# Patient Record
Sex: MALE | Race: WHITE | NOT HISPANIC OR LATINO | Employment: OTHER | URBAN - METROPOLITAN AREA
[De-identification: names, ages, dates, MRNs, and addresses within clinical notes are randomized per-mention and may not be internally consistent; named-entity substitution may affect disease eponyms.]

---

## 2017-03-16 ENCOUNTER — TELEPHONE (OUTPATIENT)
Dept: UROLOGY | Facility: CLINIC | Age: 67
End: 2017-03-16

## 2017-03-16 ENCOUNTER — OFFICE VISIT (OUTPATIENT)
Dept: UROLOGY | Facility: CLINIC | Age: 67
End: 2017-03-16
Payer: MEDICARE

## 2017-03-16 VITALS
WEIGHT: 262 LBS | HEIGHT: 72 IN | RESPIRATION RATE: 16 BRPM | DIASTOLIC BLOOD PRESSURE: 72 MMHG | SYSTOLIC BLOOD PRESSURE: 148 MMHG | BODY MASS INDEX: 35.49 KG/M2 | HEART RATE: 60 BPM

## 2017-03-16 DIAGNOSIS — R33.9 URINARY RETENTION: Primary | ICD-10-CM

## 2017-03-16 PROCEDURE — 99999 PR PBB SHADOW E&M-EST. PATIENT-LVL III: CPT | Mod: PBBFAC,,, | Performed by: UROLOGY

## 2017-03-16 PROCEDURE — 1159F MED LIST DOCD IN RCRD: CPT | Mod: S$GLB,,, | Performed by: UROLOGY

## 2017-03-16 PROCEDURE — 1125F AMNT PAIN NOTED PAIN PRSNT: CPT | Mod: S$GLB,,, | Performed by: UROLOGY

## 2017-03-16 PROCEDURE — 1157F ADVNC CARE PLAN IN RCRD: CPT | Mod: S$GLB,,, | Performed by: UROLOGY

## 2017-03-16 PROCEDURE — 51702 INSERT TEMP BLADDER CATH: CPT | Mod: S$GLB,,, | Performed by: UROLOGY

## 2017-03-16 PROCEDURE — 99203 OFFICE O/P NEW LOW 30 MIN: CPT | Mod: 25,S$GLB,, | Performed by: UROLOGY

## 2017-03-16 PROCEDURE — 1160F RVW MEDS BY RX/DR IN RCRD: CPT | Mod: S$GLB,,, | Performed by: UROLOGY

## 2017-03-16 RX ORDER — SIMETHICONE 80 MG
80 TABLET,CHEWABLE ORAL EVERY 6 HOURS PRN
COMMUNITY
End: 2017-05-30

## 2017-03-16 RX ORDER — DOXAZOSIN 1 MG/1
TABLET ORAL
Refills: 0 | COMMUNITY
Start: 2017-03-13 | End: 2017-03-16

## 2017-03-16 RX ORDER — TAMSULOSIN HYDROCHLORIDE 0.4 MG/1
0.4 CAPSULE ORAL DAILY
Qty: 30 CAPSULE | Refills: 11 | Status: SHIPPED | OUTPATIENT
Start: 2017-03-16 | End: 2017-04-20

## 2017-03-16 RX ORDER — SULFAMETHOXAZOLE AND TRIMETHOPRIM 800; 160 MG/1; MG/1
TABLET ORAL
Refills: 0 | COMMUNITY
Start: 2017-03-13 | End: 2017-03-31

## 2017-03-16 RX ORDER — TRAMADOL HYDROCHLORIDE AND ACETAMINOPHEN 37.5; 325 MG/1; MG/1
TABLET, FILM COATED ORAL
Refills: 0 | COMMUNITY
Start: 2017-03-13 | End: 2017-04-20 | Stop reason: CLARIF

## 2017-03-16 RX ORDER — DOCUSATE SODIUM 100 MG/1
CAPSULE, LIQUID FILLED ORAL
Refills: 0 | COMMUNITY
Start: 2017-03-13 | End: 2017-05-30

## 2017-03-16 NOTE — TELEPHONE ENCOUNTER
----- Message from Juanis Strickland sent at 3/16/2017  1:27 PM CDT -----  Contact: Wife   Request to speak to the nurse concerning about the pt's medication. Please contact [phone] Morenita . Thanks!

## 2017-03-16 NOTE — MR AVS SNAPSHOT
[facility]  [address]  [address]  [place] [address]  Phone: [phone]                  Camilo Kruger   3/16/2017 10:20 AM   Office Visit    Description:  Male : 1950   Provider:  Lenore Ramirez MD   Department:  [facility]           Reason for Visit     Urinary Frequency     Nocturia           Diagnoses this Visit        Comments    Urinary retention    -  Primary            To Do List           Future Appointments        Provider Department Dept Phone    3/23/2017 2:00 PM SHENG Rae MD [facility] [phone]    3/30/2017 11:00 AM Lenore Ramirez MD [facility] [phone]      Goals (5 Years of Data)     None      Follow-Up and Disposition     Return in about 1 week (around 3/23/2017) for Voiding trial.       These Medications        Disp Refills Start End    tamsulosin (FLOMAX) 0.4 mg Cp24 30 capsule 11 3/16/2017 4/15/2017    Take 1 capsule (0.4 mg total) by mouth once daily. - Oral    Pharmacy: [facility]/pharmacy #[id number] - [place] - [address] [facility] Ph #: [phone]         [facility]     [facility] -  Assistance  Registered nurses in the [facility] provide clinical advisement, health education, appointment booking, and other advisory services.  Call for this free service at [phone].             Medications           Message regarding Medications     Verify the changes and/or additions to your medication regime listed below are the same as discussed with your clinician today.  If any of these changes or additions are incorrect, please notify your healthcare provider.        START taking these NEW medications        Refills    tamsulosin (FLOMAX) 0.4 mg Cp24 11    Sig: Take 1 capsule (0.4 mg total) by mouth once daily.    Class: Normal    Route: Oral      STOP taking these medications     insulin aspart (NOVOLOG) 100 unit/mL injection Inject into the skin 3  (three) times daily before meals.    doxazosin (CARDURA) 1 MG tablet TAKE 1 TABLET (ORAL) 1 TIME PER DAY FOR 28 DAYS           Verify that the below list of medications is an accurate representation of the medications you are currently taking.  If none reported, the list may be blank. If incorrect, please contact your healthcare provider. Carry this list with you in case of emergency.           Current Medications     docusate sodium (COLACE) 100 MG capsule TAKE ONE CAPSULE BY MOUTH TWICE A DAY FOR 10 DAYS FOR CONSTIPATION    insulin detemir (LEVEMIR) 100 unit/mL injection Inject 25 Units/kg into the skin every evening.    ranitidine (ZANTAC) 150 MG capsule Take 150 mg by mouth 2 (two) times daily.    simethicone (MYLICON) 80 MG chewable tablet Take 80 mg by mouth every 6 (six) hours as needed for Flatulence.    sulfamethoxazole-trimethoprim 800-160mg (BACTRIM DS) 800-160 mg Tab TAKE 1 TABLET (ORAL) 2 TIMES PER DAY FOR 10 DAYS    tramadol-acetaminophen 37.5-325 mg (ULTRACET) 37.5-325 mg Tab TAKE 1 TABLET BY MOUTH EVERY 12 HOURS AS NEEDED FOR 5 DAYS    tamsulosin (FLOMAX) 0.4 mg Cp24 Take 1 capsule (0.4 mg total) by mouth once daily.           Clinical Reference Information           Your Vitals Were     BP Pulse Resp Height Weight BMI    148/72 60 16 6' (1.829 m) 118.8 kg (262 lb) 35.53 kg/m2      Blood Pressure          Most Recent Value    BP  (!)  148/72      Allergies as of 3/16/2017     Aspirin    Penicillins      Immunizations Administered on Date of Encounter - 3/16/2017     None      Orders Placed During Today's Visit     Future Labs/Procedures Expected by Expires    Bladder scan  As directed 3/20/2017      [facility] Sign-Up     Activating your [facility] account is as easy as 1-2-3!     1) Visit [email or web address], select Sign Up Now, enter this activation code and your date of birth, then select Next.  [email or web address]  Expires: 4/30/2017 12:07 PM      2) Create a username and password to use when you visit  [facility] in the future and select a security question in case you lose your password and select Next.    3) Enter your e-mail address and click Sign Up!    Additional Information  If you have questions, please e-mail [email or web address] or call [phone] to talk to our [facility] staff. Remember, [facility] is NOT to be used for urgent needs. For medical emergencies, dial 911.         Language Assistance Services     ATTENTION: Language assistance services are available, free of charge. Please call [phone].      ATENCIÓN: Si habla español, tiene a araujo disposición servicios gratuitos de asistencia lingüística. Llame al [phone].     CHÚ Ý: N?u b?n nói Ti?ng Vi?t, có các d?ch v? h? tr? ngôn ng? mi?n phí dành cho b?n. G?i s? [phone].         [facility] complies with applicable Federal civil rights laws and does not discriminate on the basis of race, color, national origin, age, disability, or sex.

## 2017-03-16 NOTE — PROGRESS NOTES
Subjective:       Camilo Kruger is a 66 y.o. male who is a new patient who was self-referred for evaluation of BPH/UR.       He reports dramatic worsening of urinary issues since Saturday. He was seen in Urgent Care and given Bactrim and Cardura 1mg for LUTS. No records available. He reports he is voiding [id number] with very small volume. He is very uncomfortable and distended. He has seen Dr Johnson in the past but was not on BPH medications. He is DM2 and sugars have been high recently since changing to long acting insulin. No A1c on record. Also with constipation, no BM x 4-5 days.    PVR (bladder scan) today - >999cc      The following portions of the patient's history were reviewed and updated as appropriate: allergies, current medications, past family history, past medical history, past social history, past surgical history and problem list.    Review of Systems  Constitutional: no fever or chills  ENT: no nasal congestion or sore throat  Respiratory: no cough or shortness of breath  Cardiovascular: no chest pain or palpitations  Gastrointestinal: no nausea or vomiting, tolerating diet  Genitourinary: as per HPI  Hematologic/Lymphatic: no easy bruising or lymphadenopathy  Musculoskeletal: no arthralgias or myalgias  Skin: no rashes or lesions  Neurological: no seizures or tremors  Behavioral/Psych: no auditory or visual hallucinations       Objective:    Vitals: BP (!) 148/72  Pulse 60  Resp 16  Ht 6' (1.829 m)  Wt 118.8 kg (262 lb)  BMI 35.53 kg/m2    Physical Exam   General: well developed, well nourished in no acute distress  Head: normocephalic, atraumatic  Neck: supple, trachea midline, no obvious enlargement of thyroid  HEENT: EOMI, mucus membranes moist, sclera anicteric, no hearing impairment  Lungs: symmetric expansion, non-labored breathing  Cardiovascular: regular rate and rhythm, normal pulses  Abdomen: soft, non tender, non distended, no palpable masses, no hepatosplenomegaly, no  hernias, bladder nonpalpable. No CVA tenderness.  Musculoskeletal: no peripheral edema, normal ROM in bilateral upper and lower extremities  Lymphatics: no cervical or inguinal lymphadenopathy  Skin: no rashes or lesions  Neuro: alert and oriented x 3, no gross deficits  Psych: normal judgment and insight, normal mood/affect and non-anxious  Genitourinary:   patient declined exam   ANNEMARIE: patient declined exam      Lab Review   Urine analysis today in clinic shows +glucose 1000    No results found for: WBC, HGB, HCT, MCV, PLT  No results found for: CREATININE, BUN  No results found for: PSA  No results found for: PSADIAG    Imaging  NA       Assessment/Plan:      1. Urinary retention    - Very high PVR. Distended/uncomfortable.   - Garcia placed - 1200cc   - Stop Cardura   - Start Flomax now   - Avoid/treat constipation   - Drink plenty of fluids, possible post-obstructive diuresis       Follow up in 1 week for VOT, see me in 2 weeks

## 2017-03-16 NOTE — TELEPHONE ENCOUNTER
Patient wife instructed to continue abt d/t us having no records on an infx- the prescribing provider of the abt indicated an infx and gave abt therapy- no reason why patient can not cont w/abt.

## 2017-03-23 ENCOUNTER — CLINICAL SUPPORT (OUTPATIENT)
Dept: UROLOGY | Facility: CLINIC | Age: 67
End: 2017-03-23
Payer: MEDICARE

## 2017-03-23 VITALS — WEIGHT: 261.94 LBS | BODY MASS INDEX: 35.48 KG/M2 | HEIGHT: 72 IN

## 2017-03-23 DIAGNOSIS — R33.9 URINARY RETENTION: Primary | ICD-10-CM

## 2017-03-23 PROCEDURE — 99999 PR PBB SHADOW E&M-EST. PATIENT-LVL II: CPT | Mod: PBBFAC,,, | Performed by: UROLOGY

## 2017-03-23 NOTE — PROGRESS NOTES
Name and  verified. Patient verbalized understanding of why they are here today. VOT performed.  Patient catheter bag was removed from catheter and (  240      )  ml of NS was administered. ( 10    )  ml of NS was removed from bulb and catheter d/c'd. Patient voided ( 250     ) ml of clear urine with no abnormalities.  Patient instructed to dress and MD notified. Patient will follow up in one week for a visit and a PVR.

## 2017-03-24 ENCOUNTER — TELEPHONE (OUTPATIENT)
Dept: UROLOGY | Facility: CLINIC | Age: 67
End: 2017-03-24

## 2017-03-24 NOTE — TELEPHONE ENCOUNTER
Spoke to pt per  have him come to the office an do a pvr an if in retention place 16fr.marie. See  in a week for vot./pt's wife notified of this will bring him to office/jhoana

## 2017-03-24 NOTE — TELEPHONE ENCOUNTER
Spoke to pt had voiding trial done yesterday. Pt states his stomach is distended an having pain. Pt was advised to go to er. He states they dont do anything there i told him he couldn't wait until this afternoon he really should go to the er for evaluation. Pt states he will go there for evaluation. Amanda

## 2017-03-24 NOTE — TELEPHONE ENCOUNTER
----- Message from Jaymie La sent at 3/24/2017  8:11 AM CDT -----  Contact: self  Pt would like to speak with Dorcas. Please contact him at [phone].    Thanks

## 2017-03-24 NOTE — PROGRESS NOTES
Patient came into the office this am with c/o pain in the pelvic region and abd distention. He states that even though he is able to urinate some, he feels full and unable to go when he has the urge. PVR performed and revealed 786 ml of urine in the bladder.  Patient was prepped in sterile fashion for catheter to be placed. Patient was cleansed with iodine, then a 16 fr catheter was placed. Clear urine was obtained. Balloon was inflated with 10cc of NS. Patient tolerated procedure well.  Patient to follow up within one week for VOT

## 2017-03-30 ENCOUNTER — OFFICE VISIT (OUTPATIENT)
Dept: UROLOGY | Facility: CLINIC | Age: 67
End: 2017-03-30
Payer: MEDICARE

## 2017-03-30 VITALS
HEIGHT: 72 IN | SYSTOLIC BLOOD PRESSURE: 128 MMHG | WEIGHT: 261.94 LBS | RESPIRATION RATE: 16 BRPM | DIASTOLIC BLOOD PRESSURE: 76 MMHG | BODY MASS INDEX: 35.48 KG/M2 | HEART RATE: 68 BPM

## 2017-03-30 DIAGNOSIS — R33.9 URINARY RETENTION: Primary | ICD-10-CM

## 2017-03-30 DIAGNOSIS — N39.46 MIXED INCONTINENCE URGE AND STRESS: ICD-10-CM

## 2017-03-30 PROCEDURE — 99214 OFFICE O/P EST MOD 30 MIN: CPT | Mod: S$GLB,,, | Performed by: UROLOGY

## 2017-03-30 PROCEDURE — 1160F RVW MEDS BY RX/DR IN RCRD: CPT | Mod: S$GLB,,, | Performed by: UROLOGY

## 2017-03-30 PROCEDURE — 1126F AMNT PAIN NOTED NONE PRSNT: CPT | Mod: S$GLB,,, | Performed by: UROLOGY

## 2017-03-30 PROCEDURE — 99999 PR PBB SHADOW E&M-EST. PATIENT-LVL IV: CPT | Mod: PBBFAC,,, | Performed by: UROLOGY

## 2017-03-30 PROCEDURE — 1157F ADVNC CARE PLAN IN RCRD: CPT | Mod: S$GLB,,, | Performed by: UROLOGY

## 2017-03-30 PROCEDURE — 1159F MED LIST DOCD IN RCRD: CPT | Mod: S$GLB,,, | Performed by: UROLOGY

## 2017-03-30 RX ORDER — FINASTERIDE 5 MG/1
5 TABLET, FILM COATED ORAL DAILY
Qty: 30 TABLET | Refills: 11 | Status: SHIPPED | OUTPATIENT
Start: 2017-03-30 | End: 2017-04-29

## 2017-03-30 RX ORDER — POLYETHYLENE GLYCOL 3350 17 G/17G
POWDER, FOR SOLUTION ORAL
Refills: 1 | COMMUNITY
Start: 2017-03-22 | End: 2017-05-30

## 2017-03-30 RX ORDER — INSULIN ASPART 100 [IU]/ML
INJECTION, SUSPENSION SUBCUTANEOUS
Refills: 2 | COMMUNITY
Start: 2017-03-22 | End: 2017-04-27 | Stop reason: CLARIF

## 2017-03-30 RX ORDER — CIPROFLOXACIN 250 MG/1
500 TABLET, FILM COATED ORAL
Status: CANCELLED | OUTPATIENT
Start: 2017-03-30

## 2017-03-30 NOTE — PROGRESS NOTES
Subjective:       Camilo Kruger is a 66 y.o. male who is an established patient who was self-referred for evaluation of BPH/UR.       He reports dramatic worsening of urinary issues since Saturday. He was seen in Urgent Care and given Bactrim and Cardura 1mg for LUTS. No records available. He reports he is voiding [id number] with very small volume. He is very uncomfortable and distended. He has seen Dr Johnson in the past but was not on BPH medications. He is DM2 and sugars have been high recently since changing to long acting insulin. No A1c on record. Also with constipation, no BM x 4-5 days.    PVR (bladder scan) initial visit - >999cc. Marie catheter was placed.    He underwent void trial last week as RN visit that was successful. Unfortunately, he developed recurrent UR later that day and had marie catheter replaced (>700cc). He remains on Flomax.       The following portions of the patient's history were reviewed and updated as appropriate: allergies, current medications, past family history, past medical history, past social history, past surgical history and problem list.    Review of Systems  Constitutional: no fever or chills  ENT: no nasal congestion or sore throat  Respiratory: no cough or shortness of breath  Cardiovascular: no chest pain or palpitations  Gastrointestinal: no nausea or vomiting, tolerating diet  Genitourinary: as per HPI  Hematologic/Lymphatic: no easy bruising or lymphadenopathy  Musculoskeletal: no arthralgias or myalgias  Skin: no rashes or lesions  Neurological: no seizures or tremors  Behavioral/Psych: no auditory or visual hallucinations       Objective:    Vitals: /76  Pulse 68  Resp 16  Ht 6' (1.829 m)  Wt 118.8 kg (261 lb 14.5 oz)  BMI 35.52 kg/m2    Physical Exam   General: well developed, well nourished in no acute distress  Head: normocephalic, atraumatic  Neck: supple, trachea midline, no obvious enlargement of thyroid  HEENT: EOMI, mucus membranes moist,  sclera anicteric, no hearing impairment  Lungs: symmetric expansion, non-labored breathing  Cardiovascular: regular rate and rhythm, normal pulses  Abdomen: soft, non tender, non distended, no palpable masses, no hepatosplenomegaly, no hernias, bladder nonpalpable. No CVA tenderness.  Musculoskeletal: no peripheral edema, normal ROM in bilateral upper and lower extremities  Lymphatics: no cervical or inguinal lymphadenopathy  Skin: no rashes or lesions  Neuro: alert and oriented x 3, no gross deficits  Psych: normal judgment and insight, normal mood/affect and non-anxious  Genitourinary:   Penis -  circumcised penis without plaques, lesions, or scarring.  Urethra - orthotopic location without stenosis.  Scrotum  - no lesions or rashes, no hydrocele or hernia.  Testes - descended bilaterally, symmetric without masses, non tender.  Epididymides - no cysts or lesions, no spermatocele, symmetric MARIE IN PLACE   ANNEMARIE: Symmetric 40g prostate without nodularity or tenderness. Normal landmarks. seminal vesicles non palpable, normal sphincter tone without hemorrhoids or rectal masses. Normal appearance of anus and perineum.      Lab Review   Urine analysis today in clinic shows - marie    No results found for: WBC, HGB, HCT, MCV, PLT  No results found for: CREATININE, BUN  No results found for: PSA  No results found for: PSADIAG    Imaging  NA       Assessment/Plan:      1. Urinary retention    - Very high PVR. Distended/uncomfortable.   - Marie placed - 1200cc initially   - Continue Flomax   - Start Proscar   - Avoid/treat constipation   - Initially successful VOT but failed later that day. Declines VOT today.   - Will proceed with cysto/UDS on 4/4/17       Follow up in 1-2 weeks

## 2017-03-30 NOTE — MR AVS SNAPSHOT
[facility]  [address]  [place] [address]  Phone: [phone]                  Camilo Villegasulisses   3/30/2017 11:00 AM   Office Visit    Description:  Male : 1950   Provider:  Lenore Ramirez MD   Department:  [facility]           Reason for Visit     Follow-up           Diagnoses this Visit        Comments    Urinary retention    -  Primary     Mixed incontinence urge and stress                To Do List           Goals (5 Years of Data)     None      Follow-Up and Disposition     Return in about 2 weeks (around 2017) for Post-op.       These Medications        Disp Refills Start End    finasteride (PROSCAR) 5 mg tablet 30 tablet 11 3/30/2017 2017    Take 1 tablet (5 mg total) by mouth once daily. - Oral    Pharmacy: [facility]/pharmacy #[id number] - [place] - [address] [facility] Ph #: [phone]         [facility]     [facility] On Call Nurse Care Line -  Assistance  Unless otherwise directed by your provider, please contact [facility], our nurse care line that is available for  assistance.     Registered nurses in the [facility] provide: appointment scheduling, clinical advisement, health education, and other advisory services.  Call: [phone] (toll free)               Medications           Message regarding Medications     Verify the changes and/or additions to your medication regime listed below are the same as discussed with your clinician today.  If any of these changes or additions are incorrect, please notify your healthcare provider.        START taking these NEW medications        Refills    finasteride (PROSCAR) 5 mg tablet 11    Sig: Take 1 tablet (5 mg total) by mouth once daily.    Class: Normal    Route: Oral      STOP taking these medications     insulin detemir (LEVEMIR) 100 unit/mL injection Inject 25 Units/kg into the skin every evening.           Verify that the below list of medications is  an accurate representation of the medications you are currently taking.  If none reported, the list may be blank. If incorrect, please contact your healthcare provider. Carry this list with you in case of emergency.           Current Medications     docusate sodium (COLACE) 100 MG capsule TAKE ONE CAPSULE BY MOUTH TWICE A DAY FOR 10 DAYS FOR CONSTIPATION    NOVOLOG MIX 70-30 FLEXPEN 100 unit/mL (70-30) InPn pen INEJCT 33 UNITS TWICE A DAY    polyethylene glycol (GLYCOLAX) 17 gram/dose powder MIX 17 GRAM(S) IN JUICE OR WATER DAILY    ranitidine (ZANTAC) 150 MG capsule Take 150 mg by mouth 2 (two) times daily.    simethicone (MYLICON) 80 MG chewable tablet Take 80 mg by mouth every 6 (six) hours as needed for Flatulence.    sulfamethoxazole-trimethoprim 800-160mg (BACTRIM DS) 800-160 mg Tab TAKE 1 TABLET (ORAL) 2 TIMES PER DAY FOR 10 DAYS    tamsulosin (FLOMAX) 0.4 mg Cp24 Take 1 capsule (0.4 mg total) by mouth once daily.    tramadol-acetaminophen 37.5-325 mg (ULTRACET) 37.5-325 mg Tab TAKE 1 TABLET BY MOUTH EVERY 12 HOURS AS NEEDED FOR 5 DAYS    finasteride (PROSCAR) 5 mg tablet Take 1 tablet (5 mg total) by mouth once daily.           Clinical Reference Information           Your Vitals Were     BP Pulse Resp Height Weight BMI    128/76 68 16 6' (1.829 m) 118.8 kg (261 lb 14.5 oz) 35.52 kg/m2      Blood Pressure          Most Recent Value    BP  128/76      Allergies as of 3/30/2017     Aspirin    Penicillins      Immunizations Administered on Date of Encounter - 3/30/2017     None      Orders Placed During Today's Visit      Normal Orders This Visit    Case Request Operating Room: CYSTOSCOPY - URODYNAMICS FLOW STUDY       [facility] Sign-Up     Activating your [facility] account is as easy as 1-2-3!     1) Visit [email or web address], select Sign Up Now, enter this activation code and your date of birth, then select Next.  [email or web address]  Expires: 4/30/2017 12:07 PM      2) Create a username and password to use when you  visit [facility] in the future and select a security question in case you lose your password and select Next.    3) Enter your e-mail address and click Sign Up!    Additional Information  If you have questions, please e-mail [email or web address] or call [phone] to talk to our [facility] staff. Remember, [facility] is NOT to be used for urgent needs. For medical emergencies, dial 911.         Language Assistance Services     ATTENTION: Language assistance services are available, free of charge. Please call [phone].      ATENCIÓN: Si habla español, tiene a araujo disposición servicios gratuitos de asistencia lingüística. Llame al [phone].     CHÚ Ý: N?u b?n nói Ti?ng Vi?t, có các d?ch v? h? tr? ngôn ng? mi?n phí dành cho b?n. G?i s? [phone].         [facility] complies with applicable Federal civil rights laws and does not discriminate on the basis of race, color, national origin, age, disability, or sex.

## 2017-03-30 NOTE — H&P
Subjective:       Camilo Kruegr is a 66 y.o. male who is an established patient who was self-referred for evaluation of BPH/UR.       He reports dramatic worsening of urinary issues since Saturday. He was seen in Urgent Care and given Bactrim and Cardura 1mg for LUTS. No records available. He reports he is voiding [id number] with very small volume. He is very uncomfortable and distended. He has seen Dr Johnson in the past but was not on BPH medications. He is DM2 and sugars have been high recently since changing to long acting insulin. No A1c on record. Also with constipation, no BM x 4-5 days.    PVR (bladder scan) initial visit - >999cc. Marie catheter was placed.    He underwent void trial last week as RN visit that was successful. Unfortunately, he developed recurrent UR later that day and had marie catheter replaced (>700cc). He remains on Flomax.       The following portions of the patient's history were reviewed and updated as appropriate: allergies, current medications, past family history, past medical history, past social history, past surgical history and problem list.    Review of Systems  Constitutional: no fever or chills  ENT: no nasal congestion or sore throat  Respiratory: no cough or shortness of breath  Cardiovascular: no chest pain or palpitations  Gastrointestinal: no nausea or vomiting, tolerating diet  Genitourinary: as per HPI  Hematologic/Lymphatic: no easy bruising or lymphadenopathy  Musculoskeletal: no arthralgias or myalgias  Skin: no rashes or lesions  Neurological: no seizures or tremors  Behavioral/Psych: no auditory or visual hallucinations       Objective:    Vitals: /76  Pulse 68  Resp 16  Ht 6' (1.829 m)  Wt 118.8 kg (261 lb 14.5 oz)  BMI 35.52 kg/m2    Physical Exam   General: well developed, well nourished in no acute distress  Head: normocephalic, atraumatic  Neck: supple, trachea midline, no obvious enlargement of thyroid  HEENT: EOMI, mucus membranes moist,  sclera anicteric, no hearing impairment  Lungs: symmetric expansion, non-labored breathing  Cardiovascular: regular rate and rhythm, normal pulses  Abdomen: soft, non tender, non distended, no palpable masses, no hepatosplenomegaly, no hernias, bladder nonpalpable. No CVA tenderness.  Musculoskeletal: no peripheral edema, normal ROM in bilateral upper and lower extremities  Lymphatics: no cervical or inguinal lymphadenopathy  Skin: no rashes or lesions  Neuro: alert and oriented x 3, no gross deficits  Psych: normal judgment and insight, normal mood/affect and non-anxious  Genitourinary:   Penis -  circumcised penis without plaques, lesions, or scarring.  Urethra - orthotopic location without stenosis.  Scrotum  - no lesions or rashes, no hydrocele or hernia.  Testes - descended bilaterally, symmetric without masses, non tender.  Epididymides - no cysts or lesions, no spermatocele, symmetric MARIE IN PLACE   ANNEMARIE: Symmetric 40g prostate without nodularity or tenderness. Normal landmarks. seminal vesicles non palpable, normal sphincter tone without hemorrhoids or rectal masses. Normal appearance of anus and perineum.      Lab Review   Urine analysis today in clinic shows - marie    No results found for: WBC, HGB, HCT, MCV, PLT  No results found for: CREATININE, BUN  No results found for: PSA  No results found for: PSADIAG    Imaging  NA       Assessment/Plan:      1. Urinary retention    - Very high PVR. Distended/uncomfortable.   - Marie placed - 1200cc initially   - Continue Flomax   - Start Proscar   - Avoid/treat constipation   - Initially successful VOT but failed later that day. Declines VOT today.   - Will proceed with cysto/UDS on 4/4/17       Follow up in 1-2 weeks

## 2017-03-31 ENCOUNTER — HOSPITAL ENCOUNTER (OUTPATIENT)
Dept: PREADMISSION TESTING | Facility: HOSPITAL | Age: 67
Discharge: HOME | End: 2017-03-31
Attending: UROLOGY
Payer: MEDICARE

## 2017-03-31 VITALS
BODY MASS INDEX: 34.69 KG/M2 | TEMPERATURE: 97 F | HEIGHT: 72 IN | WEIGHT: 256.13 LBS | OXYGEN SATURATION: 95 % | SYSTOLIC BLOOD PRESSURE: 133 MMHG | DIASTOLIC BLOOD PRESSURE: 71 MMHG | RESPIRATION RATE: 20 BRPM

## 2017-03-31 NOTE — DISCHARGE INSTRUCTIONS
Your surgery is scheduled for _TUESDAY 4/4___________________.    Call [phone] between 12 p.m. and 5 p.m. on   _MONDAY 4/3______________ to find out your arrival time for the day of your surgery.      Please report to SAME DAY SURGERY UNIT on the 2nd FLOOR at _______ a.m.  Use front door entrance. The doors open at 0530 am.          INSTRUCTIONS IMPORTANT!!!      ¨ YOU MAY eat or drink after t-including water. OK to brush teeth, no   gum, candy or mints!    ¨ Take only these medicines with a small swallow of water-morning of surgery.--NORMAL MORNING MEDICINE            __X__  Prep instructions:   SHOWER   OTHER  ______________________    _X___  No powder, lotions or creams to your body.  _X___  You may wear only deodorant on the day of surgery.  _X___  Please remove all jewelry, including piercings and leave at home.  _X___  No money or valuables needed. Please leave at home.  You may bring your           cell phone.    _X___  If going home the same day, arrange for a ride home. You will not be able to   drive if Anesthesia was used.    _X___  Wear loose fitting clothing. Allow for dressings, bandages.  __X__  Stop Aspirin, Ibuprofen, Motrin and Aleve at least 3-5 days before                       surgery, unless otherwise instructed by your doctor, or the nurse.              You MAY use Tylenol/acetaminophen until day of surgery.    _X___  Call MD for temperature above 101 degrees.        __X__ Stop taking any Fish Oil supplement or any Vitamins that contain Vitamin                  E at least 5 days prior to surgery.          I have read or had read and explained to me, and understand the above information.    Additional comments or instructions:Please call   [phone] if you have any questions regarding the instructions above.

## 2017-03-31 NOTE — PLAN OF CARE
Pre-operative instructions, medication directives and pain scales reviewed with Mr Kruger. All questions the patient had  were answered. Re-assurance about surgical procedure and day of surgery routine given as needed. Mr Kruger verbalized understanding of the pre-op instructions.

## 2017-03-31 NOTE — IP AVS SNAPSHOT
[place] [address] [place] [address]  Phone: [phone]           Patient Discharge Instructions  Our goal is to set you up for success. This packet includes information on your condition, medications, and your home care. It will help you care for yourself to prevent having to return to the hospital.     Please ask your nurse if you have any questions.      There are many details to remember when preparing for your surgery. Here is what you will need to do, please ask your nurse if there are more specific instructions and if you have any questions:    1. Before procedure Do not smoke or drink alcoholic beverages 24 hours prior to your procedure. Do not eat or drink anything 8 hours before your procedure - this includes gum, mints, and candy.     2. Day of procedure Please remove all jewelry for the procedure. If you wear contact lenses, dentures, hearing aids or glasses, bring a container to put them in during your surgery and give to a family member.  If your doctor has scheduled you for an overnight stay, bring a small overnight bag with any personal items that you need.      3. After procedure  Make arrangements in advance for transportation home by a responsible adult. It is not safe to drive a vehicle during the 24 hours following surgery.     PLEASE NOTE: You may be contacted the day before your surgery to confirm your surgery date and arrival time. The Surgery schedule has many variables which may affect the time of your surgery case. Family members should be available if your surgery time changes.           [facility]  Unless otherwise directed by your provider, please   contact [facility], our nurse care line   that is available for 24/7 assistance.     [phone] (toll-free)     Registered nurses in the [facility]   provide: appointment scheduling, clinical advisement, health education, and other advisory services.                  ** Verify  the list of medication(s) below is accurate and up to date. Carry this with you in case of emergency. If your medications have changed, please notify your healthcare provider.             Medication List      TAKE these medications        Additional Info                      docusate sodium 100 MG capsule   Commonly known as:  COLACE   Refills:  0    Instructions:  TAKE ONE CAPSULE BY MOUTH TWICE A DAY FOR 10 DAYS FOR CONSTIPATION     Begin Date    AM    Noon    PM    Bedtime       finasteride 5 mg tablet   Commonly known as:  PROSCAR   Quantity:  30 tablet   Refills:  11   Dose:  5 mg    Instructions:  Take 1 tablet (5 mg total) by mouth once daily.     Begin Date    AM    Noon    PM    Bedtime       NOVOLOG MIX 70-30 FLEXPEN 100 unit/mL (70-30) Inpn pen   Refills:  2   Generic drug:  insulin aspart protamine-insulin aspart    Instructions:  INEJCT 33 UNITS TWICE A DAY     Begin Date    AM    Noon    PM    Bedtime       polyethylene glycol 17 gram/dose powder   Commonly known as:  GLYCOLAX   Refills:  1    Instructions:  MIX 17 GRAM(S) IN JUICE OR WATER DAILY     Begin Date    AM    Noon    PM    Bedtime       ranitidine 150 MG capsule   Commonly known as:  ZANTAC   Refills:  0   Dose:  150 mg    Instructions:  Take 150 mg by mouth 2 (two) times daily.     Begin Date    AM    Noon    PM    Bedtime       simethicone 80 MG chewable tablet   Commonly known as:  MYLICON   Refills:  0   Dose:  80 mg    Instructions:  Take 80 mg by mouth every 6 (six) hours as needed for Flatulence.     Begin Date    AM    Noon    PM    Bedtime       tamsulosin 0.4 mg Cp24   Commonly known as:  FLOMAX   Quantity:  30 capsule   Refills:  11   Dose:  0.4 mg    Instructions:  Take 1 capsule (0.4 mg total) by mouth once daily.     Begin Date    AM    Noon    PM    Bedtime       tramadol-acetaminophen 37.5-325 mg 37.5-325 mg Tab   Commonly known as:  ULTRACET   Refills:  0    Instructions:  TAKE 1 TABLET BY MOUTH EVERY 12 HOURS AS NEEDED FOR  5 DAYS     Begin Date    AM    Noon    PM    Bedtime                  Please bring to all follow up appointments:    1. A copy of your discharge instructions.  2. All medicines you are currently taking in their original bottles.  3. Identification and insurance card.    Please arrive 15 minutes ahead of scheduled appointment time.    Please call 24 hours in advance if you must reschedule your appointment and/or time.        Your Scheduled Appointments     Apr 12, 2017 10:00 AM CDT   Established Patient Visit with Lenore Ramirez MD   [facility] ([facility])    [address] [place] [address], [facility] [address]  [facility] [address] [phone]              Your Future Surgeries/Procedures     Apr 04, 2017   Surgery with Lenore Ramirez MD   [facility] ([facility])    [facility] [place] [address] [phone]                  Discharge Instructions         Your surgery is scheduled for _TUESDAY 4/4___________________.    Call [phone] between 12 p.m. and 5 p.m. on   _MONDAY 4/3______________ to find out your arrival time for the day of your surgery.      Please report to SAME DAY SURGERY UNIT on the 2nd FLOOR at _______ a.m.  Use front door entrance. The doors open at 0530 am.          INSTRUCTIONS IMPORTANT!!!      ¨ YOU MAY eat or drink after t-including water. OK to brush teeth, no   gum, candy or mints!    ¨ Take only these medicines with a small swallow of water-morning of surgery.--NORMAL MORNING MEDICINE            __X__  Prep instructions:   SHOWER   OTHER  ______________________    _X___  No powder, lotions or creams to your body.  _X___  You may wear only deodorant on the day of surgery.  _X___  Please remove all jewelry, including piercings and leave at home.  _X___  No money or valuables needed. Please leave at home.  You may bring your           cell phone.    _X___  If going home the same day, arrange for a ride home. You will not be able to  "  drive if Anesthesia was used.    _X___  Wear loose fitting clothing. Allow for dressings, bandages.  __X__  Stop Aspirin, Ibuprofen, Motrin and Aleve at least 3-5 days before                       surgery, unless otherwise instructed by your doctor, or the nurse.              You MAY use Tylenol/acetaminophen until day of surgery.    _X___  Call MD for temperature above 101 degrees.        __X__ Stop taking any Fish Oil supplement or any Vitamins that contain Vitamin                  E at least 5 days prior to surgery.          I have read or had read and explained to me, and understand the above information.    Additional comments or instructions:Please call   [phone] if you have any questions regarding the instructions above.                 Admission Information     Date & Time Provider Department CSN    3/31/2017  8:00 AM Lenore Ramirez MD [facility] [id number]      Care Providers     Provider Role Specialty Primary office phone    Lenore Ramirez MD Attending Provider Urology [phone]      Your Vitals Were     BP Temp Resp Height Weight SpO2    133/71 (BP Location: Right arm, Patient Position: Sitting, BP Method: Automatic) 96.9 °F (36.1 °C) (Oral) 20 6' (1.829 m) 116.2 kg (256 lb 2 oz) 95%    BMI                34.74 kg/m2          Recent Lab Values     No lab values to display.      Allergies as of 3/31/2017        Reactions    Aspirin Other (See Comments)    Nose bleeds     Cholesterol Analogues Other (See Comments)    "stabbing pains in muscles"    Codeine Hallucinations    Metformin Other (See Comments)    "stabbing pain in muscles"    Penicillins       Advance Directives     An advance directive is a document which, in the event you are no longer able to make decisions for yourself, tells your healthcare team what kind of treatment you do or do not want to receive, or who you would like to make those decisions for you.  If you do not currently have an advance " directive, [facility] encourages you to create one.  For more information call:  [phone]), [phone] ([phone]),  or log on to [email or web address].        Language Assistance Services     ATTENTION: Language assistance services are available, free of charge. Please call [phone].      ATENCIÓN: Si habla español, tiene a araujo disposición servicios gratuitos de asistencia lingüística. Llame al [phone].     CHÚ Ý: N?u b?n nói Ti?ng Vi?t, có các d?ch v? h? tr? ngôn ng? mi?n phí dành cho b?n. G?i s? [phone].        [facility] Sign-Up     Activating your [facility] account is as easy as 1-2-3!     1) Visit [email or web address], select Sign Up Now, enter this activation code and your date of birth, then select Next.  [email or web address]  Expires: 4/30/2017 12:07 PM      2) Create a username and password to use when you visit [facility] in the future and select a security question in case you lose your password and select Next.    3) Enter your e-mail address and click Sign Up!    Additional Information  If you have questions, please e-mail [email or web address] or call [phone] to talk to our [facility] staff. Remember, [facility] is NOT to be used for urgent needs. For medical emergencies, dial 911.          [facility] complies with applicable Federal civil rights laws and does not discriminate on the basis of race, color, national origin, age, disability, or sex.

## 2017-04-04 ENCOUNTER — TELEPHONE (OUTPATIENT)
Dept: UROLOGY | Facility: CLINIC | Age: 67
End: 2017-04-04

## 2017-04-04 ENCOUNTER — HOSPITAL ENCOUNTER (OUTPATIENT)
Facility: HOSPITAL | Age: 67
Discharge: HOME | End: 2017-04-04
Attending: UROLOGY | Admitting: UROLOGY
Payer: MEDICARE

## 2017-04-04 ENCOUNTER — SURGERY (OUTPATIENT)
Age: 67
End: 2017-04-04

## 2017-04-04 VITALS
OXYGEN SATURATION: 98 % | HEIGHT: 72 IN | SYSTOLIC BLOOD PRESSURE: 133 MMHG | TEMPERATURE: 98 F | RESPIRATION RATE: 18 BRPM | WEIGHT: 256 LBS | HEART RATE: 76 BPM | BODY MASS INDEX: 34.67 KG/M2 | DIASTOLIC BLOOD PRESSURE: 77 MMHG

## 2017-04-04 DIAGNOSIS — R33.9 URINARY RETENTION: Primary | ICD-10-CM

## 2017-04-04 DIAGNOSIS — N39.46 MIXED INCONTINENCE URGE AND STRESS: ICD-10-CM

## 2017-04-04 PROCEDURE — 52000 CYSTOURETHROSCOPY: CPT | Mod: 59,,, | Performed by: UROLOGY

## 2017-04-04 PROCEDURE — 25000003 PHARM REV CODE 250: Performed by: UROLOGY

## 2017-04-04 PROCEDURE — 36000706: Performed by: UROLOGY

## 2017-04-04 PROCEDURE — 71000015 HC POSTOP RECOV 1ST HR: Performed by: UROLOGY

## 2017-04-04 PROCEDURE — 76000 FLUOROSCOPY <1 HR PHYS/QHP: CPT | Mod: 26,59,, | Performed by: UROLOGY

## 2017-04-04 PROCEDURE — 51728 CYSTOMETROGRAM W/VP: CPT | Mod: 26,,, | Performed by: UROLOGY

## 2017-04-04 PROCEDURE — 36000707: Performed by: UROLOGY

## 2017-04-04 PROCEDURE — 25500020 PHARM REV CODE 255: Performed by: UROLOGY

## 2017-04-04 PROCEDURE — 51784 ANAL/URINARY MUSCLE STUDY: CPT | Mod: 26,51,, | Performed by: UROLOGY

## 2017-04-04 PROCEDURE — 51741 ELECTRO-UROFLOWMETRY FIRST: CPT | Mod: 26,51,, | Performed by: UROLOGY

## 2017-04-04 RX ORDER — ACETAMINOPHEN 325 MG/1
650 TABLET ORAL EVERY 4 HOURS PRN
Status: DISCONTINUED | OUTPATIENT
Start: 2017-04-04 | End: 2017-04-04 | Stop reason: HOSPADM

## 2017-04-04 RX ORDER — ONDANSETRON 2 MG/ML
4 INJECTION INTRAMUSCULAR; INTRAVENOUS EVERY 12 HOURS PRN
Status: DISCONTINUED | OUTPATIENT
Start: 2017-04-04 | End: 2017-04-04 | Stop reason: HOSPADM

## 2017-04-04 RX ORDER — LIDOCAINE HYDROCHLORIDE 20 MG/ML
JELLY TOPICAL
Status: DISCONTINUED | OUTPATIENT
Start: 2017-04-04 | End: 2017-04-04 | Stop reason: HOSPADM

## 2017-04-04 RX ORDER — CIPROFLOXACIN 500 MG/1
500 TABLET ORAL
Status: COMPLETED | OUTPATIENT
Start: 2017-04-04 | End: 2017-04-04

## 2017-04-04 RX ADMIN — LIDOCAINE HYDROCHLORIDE 10 ML: 20 JELLY TOPICAL at 01:04

## 2017-04-04 RX ADMIN — IOTHALAMATE MEGLUMINE 250 ML: 172 INJECTION URETERAL at 01:04

## 2017-04-04 RX ADMIN — CIPROFLOXACIN HYDROCHLORIDE 500 MG: 500 TABLET, FILM COATED ORAL at 12:04

## 2017-04-04 NOTE — OP NOTE
[facility]  Surgery Department  Operative Note      Date of Procedure:  04/04/2017 12:26 PM     Procedure:   1. Complex urodynamics with fluoroscopy  2. Cystoscopy    Surgeon(s) and Role:     * Lenore Ramirez MD - Primary    Assisting Surgeon: None    Pre-Operative Diagnosis: Urinary retention    Post-Operative Diagnosis: Post-Op Diagnosis Codes:   Urinary retention    Indication for procedure:  Camilo Kruger is a 66 y.o. male who is an established patient who was self-referred for evaluation of BPH/UR.      He reports dramatic worsening of urinary issues since Saturday. He was seen in Urgent Care and given Bactrim and Cardura 1mg for LUTS. No records available. He reports he is voiding [id number] with very small volume. He is very uncomfortable and distended. He has seen Dr Johnson in the past but was not on BPH medications. He is DM2 and sugars have been high recently since changing to long acting insulin. No A1c on record. Also with constipation, no BM x 4-5 days.     PVR (bladder scan) initial visit - >999cc. Marie catheter was placed.     He underwent void trial last week as RN visit that was successful. Unfortunately, he developed recurrent UR later that day and had marie catheter replaced (>700cc). He remains on Flomax.       Description of procedure:  The patient was brought to the urodynamics suite and transferred to the urodynamics chair. Full timeout procedures were performed identifying the correct patient and procedure. Appropriate antibiotics with PO ciprofloxacin were given prior to commencement of surgery A 16-[place] red rubber catheter was then placed per urethra after genitals were prepped with Betadine solution to remove any residual urine in bladder.  P1 and P2 catheters were placed in the urethra into the bladder and rectally respectively. EMG senses were placed in the appropriate location on perineum and left leg. The bladder was filled at an appropriately slow  rate.    PVR pre-procedure: marie catheter in place    Filling phase:  Rate of fillin-40 mL/min  First sensation: 146mL  First desire: 226mL  Strong desire: 253mL  Capacity: 400mL - stopped due to pt discomfort  Permission to void given at 276mL    Stress maneuvers (Valsalva and cough) at NA  VLLP: NA  Compliance: moderately decreased at high volumes  ALLP: NA  Involuntary bladder contraction: none    Voiding phase:  Bladder contraction: none. Valsalva maneuvers without void.  Coordination: EMG flaring  Flow rate (max): NA  Flow rate (avg): NA  Pdet (max flow): no void  Max Pdet: no void  Voided volume: 0mL  PVR: 400mL    P1 removed with attempt to void - no void.    Fluoroscopy:  Bladder wall: smooth, no intravesical prostate  Voiding: NA  Vesicoureteral reflux: none  Radiographic PVR: large volume, full      Cystoscopy:  At the conclusion of the urodynamics procedure, P1 and P2 catheters as well as EMG sensors were removed. The patient then was placed in the dorsal lithotomy position and prepped and draped in the usual sterile fashion. Uro-Jet lidocaine was placed in the urethra for maddison-operative analgesia. A 16-[place]ijani flexible cystoscope was passed per urethra into the bladder. Full cystoscopy was performed systematically to inspect all aspects of the bladder wall. Retroflexion of the cystoscope was done to inspect the bladder neck. Bilateral UOs were visualized. Urethra was carefully inspected upon removal of cystoscope. The procedure was terminated. The patient tolerated the procedure well.    Urethra: normal  Bladder mucosa: smooth, distended  Trabeculation: mild, likely more related to bladder distention  UOs: normal, orthotopic  Prostate: bilobar hypertrophy, kissing lobes. No intravesical prostate.      Uroflow performed after cystoscopy:    Uroflow:  Max flow: 23.2mL/s  Avg flow: 8.7mL/s  Flow time: 30.6sec  Voided volume: 267mL  PVR: 275mL  Observed Valsalva straining during voiding. Good  flow.    16Fr marie catheter placed with sterile technique. 10cc sterile water placed in balloon.    Anesthesia: Local    Findings of the Procedure: Mild delayed sensation. No IBC/DO. EDUIN not assessed. Normal capacity bladder. Inability to void with and without P1 in place. Valsalva attempts. Cystoscopy essentially normal except for bilobar hypertrophy with kissing lobes. Uroflow with good flow but incomplete emptying with straining.     Complications: none    Estimated Blood Loss (EBL): 0cc          Drains: none    Specimens: none     Attestation: I was present the entirety of the procedure.           Disposition:  Patient is stable and deemed appropriate for discharge home. The patient will follow up in 2-3 weeks.    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 Bilateral prostatic hypertrophy on cystoscopy. Good flow with suspected Valsalva voiding during uroflow. Recommend TURP though concerns remain for continued UR. May require long term catheterization vs CIC. ?InterStim. Also with back issues - may be related to bladder dysfunction.        Discharge Note    SUMMARY     Admit Date:  04/04/2017 2:51 PM    Discharge Date and Time:  04/04/2017 2:51 PM    Hospital Course (synopsis of major diagnoses, care, treatment, and services provided during the course of the hospital stay): Uncomplicated cysto/UDS.     Final Diagnosis: Post-Op Diagnosis Codes:     * Mixed incontinence urge and stress [788.33]    Disposition: Home or Self Care    Follow Up/Patient Instructions:   Expect blood and burning with urination. Drink plenty of fluids.    Medications:  Reconciled Home Medications:   Discharge Medication List as of 4/4/2017  2:30 PM      CONTINUE these medications which have NOT CHANGED    Details   docusate sodium (COLACE) 100 MG capsule TAKE ONE CAPSULE BY MOUTH TWICE A DAY FOR 10 DAYS FOR CONSTIPATION, Historical Med      finasteride (PROSCAR) 5 mg tablet Take 1 tablet (5 mg total) by mouth once  daily., Starting 3/30/2017, Until Sat 4/29/17, Normal      NOVOLOG MIX 70-30 FLEXPEN 100 unit/mL (70-30) InPn pen INEJCT 33 UNITS TWICE A DAY, Historical Med      polyethylene glycol (GLYCOLAX) 17 gram/dose powder MIX 17 GRAM(S) IN JUICE OR WATER DAILY, Historical Med      ranitidine (ZANTAC) 150 MG capsule Take 150 mg by mouth 2 (two) times daily., Until Discontinued, Historical Med      simethicone (MYLICON) 80 MG chewable tablet Take 80 mg by mouth every 6 (six) hours as needed for Flatulence., Until Discontinued, Historical Med      tamsulosin (FLOMAX) 0.4 mg Cp24 Take 1 capsule (0.4 mg total) by mouth once daily., Starting 3/16/2017, Until Sat 4/15/17, Normal      tramadol-acetaminophen 37.5-325 mg (ULTRACET) 37.5-325 mg Tab TAKE 1 TABLET BY MOUTH EVERY 12 HOURS AS NEEDED FOR 5 DAYS, Historical Med             Discharge Procedure Orders  Diet general     Activity as tolerated     Call MD for:  temperature >100.4     Call MD for:  persistent nausea and vomiting     Call MD for:  severe uncontrolled pain     Call MD for:  difficulty breathing, headache or visual disturbances     No dressing needed     Follow-up Information     Follow up with Lenore Ramirez MD In 2 weeks.    Specialty:  Urology    Why:  For post-op follow up    Contact information:    [address] [place] [id number]  [phone]

## 2017-04-04 NOTE — DISCHARGE INSTRUCTIONS
ACTIVITY LEVEL: If you have received sedation or an anesthetic, you may feel sleepy for several hours. Rest until you are more awake. Gradually resume your normal activities.       DIET: You may resume your home diet. If nausea is present, increase your diet gradually with fluids and bland foods.      Medications: Pain medication should be taken only if needed and as directed. If antibiotics are prescribed, the medication should be taken until completed. You will be given an updated list of you medications.        CALL THE DOCTOR:       · Fever over 101°F  · Severe pain that doesnt go away with medication.  · Upset stomach and vomiting that is persistent.  · Problems urinating-unable to urinate or heavy bleeding (with or without clots)

## 2017-04-04 NOTE — H&P (VIEW-ONLY)
Subjective:       Camilo Kruger is a 66 y.o. male who is an established patient who was self-referred for evaluation of BPH/UR.       He reports dramatic worsening of urinary issues since Saturday. He was seen in Urgent Care and given Bactrim and Cardura 1mg for LUTS. No records available. He reports he is voiding [id number] with very small volume. He is very uncomfortable and distended. He has seen Dr Johnson in the past but was not on BPH medications. He is DM2 and sugars have been high recently since changing to long acting insulin. No A1c on record. Also with constipation, no BM x 4-5 days.    PVR (bladder scan) initial visit - >999cc. Marie catheter was placed.    He underwent void trial last week as RN visit that was successful. Unfortunately, he developed recurrent UR later that day and had marie catheter replaced (>700cc). He remains on Flomax.       The following portions of the patient's history were reviewed and updated as appropriate: allergies, current medications, past family history, past medical history, past social history, past surgical history and problem list.    Review of Systems  Constitutional: no fever or chills  ENT: no nasal congestion or sore throat  Respiratory: no cough or shortness of breath  Cardiovascular: no chest pain or palpitations  Gastrointestinal: no nausea or vomiting, tolerating diet  Genitourinary: as per HPI  Hematologic/Lymphatic: no easy bruising or lymphadenopathy  Musculoskeletal: no arthralgias or myalgias  Skin: no rashes or lesions  Neurological: no seizures or tremors  Behavioral/Psych: no auditory or visual hallucinations       Objective:    Vitals: /76  Pulse 68  Resp 16  Ht 6' (1.829 m)  Wt 118.8 kg (261 lb 14.5 oz)  BMI 35.52 kg/m2    Physical Exam   General: well developed, well nourished in no acute distress  Head: normocephalic, atraumatic  Neck: supple, trachea midline, no obvious enlargement of thyroid  HEENT: EOMI, mucus membranes moist,  sclera anicteric, no hearing impairment  Lungs: symmetric expansion, non-labored breathing  Cardiovascular: regular rate and rhythm, normal pulses  Abdomen: soft, non tender, non distended, no palpable masses, no hepatosplenomegaly, no hernias, bladder nonpalpable. No CVA tenderness.  Musculoskeletal: no peripheral edema, normal ROM in bilateral upper and lower extremities  Lymphatics: no cervical or inguinal lymphadenopathy  Skin: no rashes or lesions  Neuro: alert and oriented x 3, no gross deficits  Psych: normal judgment and insight, normal mood/affect and non-anxious  Genitourinary:   Penis -  circumcised penis without plaques, lesions, or scarring.  Urethra - orthotopic location without stenosis.  Scrotum  - no lesions or rashes, no hydrocele or hernia.  Testes - descended bilaterally, symmetric without masses, non tender.  Epididymides - no cysts or lesions, no spermatocele, symmetric MARIE IN PLACE   ANNEMARIE: Symmetric 40g prostate without nodularity or tenderness. Normal landmarks. seminal vesicles non palpable, normal sphincter tone without hemorrhoids or rectal masses. Normal appearance of anus and perineum.      Lab Review   Urine analysis today in clinic shows - marie    No results found for: WBC, HGB, HCT, MCV, PLT  No results found for: CREATININE, BUN  No results found for: PSA  No results found for: PSADIAG    Imaging  NA       Assessment/Plan:      1. Urinary retention    - Very high PVR. Distended/uncomfortable.   - Marie placed - 1200cc initially   - Continue Flomax   - Start Proscar   - Avoid/treat constipation   - Initially successful VOT but failed later that day. Declines VOT today.   - Will proceed with cysto/UDS on 4/4/17       Follow up in 1-2 weeks

## 2017-04-04 NOTE — IP AVS SNAPSHOT
[place] [address] [place] [address]  Phone: [phone]           Patient Discharge Instructions   Our goal is to set you up for success. This packet includes information on your condition, medications, and your home care.  It will help you care for yourself to prevent having to return to the hospital.     Please ask your nurse if you have any questions.      There are many details to remember when preparing to leave the hospital. Here is what you will need to do:    1. Take your medicine. If you are prescribed medications, review your Medication List on the following pages. You may have new medications to  at the pharmacy and others that you'll need to stop taking. Review the instructions for how and when to take your medications. Talk with your doctor or nurses if you are unsure of what to do.     2. Go to your follow-up appointments. Specific follow-up information is listed in the following pages. Your may be contacted by a nurse or clinical provider about future appointments. Be sure we have all of the phone numbers to reach you. Please contact your provider's office if you are unable to make an appointment.     3. Watch for warning signs. Your doctor or nurse will give you detailed warning signs to watch for and when to call for assistance. These instructions may also include educational information about your condition. If you experience any of warning signs to your health, call your doctor.           [facility]  Unless otherwise directed by your provider, please   contact [facility], our nurse care line   that is available for 24/7 assistance.     [phone] (toll-free)     Registered nurses in the [facility]   provide: appointment scheduling, clinical advisement, health education, and other advisory services.                  ** Verify the list of medication(s) below is accurate and up to date. Carry this with you in case of emergency.  If your medications have changed, please notify your healthcare provider.             Medication List      CONTINUE taking these medications        Additional Info                      docusate sodium 100 MG capsule   Commonly known as:  COLACE   Refills:  0    Instructions:  TAKE ONE CAPSULE BY MOUTH TWICE A DAY FOR 10 DAYS FOR CONSTIPATION     Begin Date    AM    Noon    PM    Bedtime       finasteride 5 mg tablet   Commonly known as:  PROSCAR   Quantity:  30 tablet   Refills:  11   Dose:  5 mg    Instructions:  Take 1 tablet (5 mg total) by mouth once daily.     Begin Date    AM    Noon    PM    Bedtime       NOVOLOG MIX 70-30 FLEXPEN 100 unit/mL (70-30) Inpn pen   Refills:  2   Generic drug:  insulin aspart protamine-insulin aspart    Instructions:  INEJCT 33 UNITS TWICE A DAY     Begin Date    AM    Noon    PM    Bedtime       polyethylene glycol 17 gram/dose powder   Commonly known as:  GLYCOLAX   Refills:  1    Instructions:  MIX 17 GRAM(S) IN JUICE OR WATER DAILY     Begin Date    AM    Noon    PM    Bedtime       ranitidine 150 MG capsule   Commonly known as:  ZANTAC   Refills:  0   Dose:  150 mg    Instructions:  Take 150 mg by mouth 2 (two) times daily.     Begin Date    AM    Noon    PM    Bedtime       simethicone 80 MG chewable tablet   Commonly known as:  MYLICON   Refills:  0   Dose:  80 mg    Instructions:  Take 80 mg by mouth every 6 (six) hours as needed for Flatulence.     Begin Date    AM    Noon    PM    Bedtime       tamsulosin 0.4 mg Cp24   Commonly known as:  FLOMAX   Quantity:  30 capsule   Refills:  11   Dose:  0.4 mg    Instructions:  Take 1 capsule (0.4 mg total) by mouth once daily.     Begin Date    AM    Noon    PM    Bedtime       tramadol-acetaminophen 37.5-325 mg 37.5-325 mg Tab   Commonly known as:  ULTRACET   Refills:  0    Instructions:  TAKE 1 TABLET BY MOUTH EVERY 12 HOURS AS NEEDED FOR 5 DAYS     Begin Date    AM    Noon    PM    Bedtime                  Please bring to all  follow up appointments:    1. A copy of your discharge instructions.  2. All medicines you are currently taking in their original bottles.  3. Identification and insurance card.    Please arrive 15 minutes ahead of scheduled appointment time.    Please call 24 hours in advance if you must reschedule your appointment and/or time.        Your Scheduled Appointments     Apr 12, 2017 10:00 AM CDT   Established Patient Visit with Lenore Ramirez MD   [facility] ([facility])    [address]  [place] [id number] [phone]              Follow-up Information     Follow up with Lenore Ramirez MD In 1 week.    Specialty:  Urology    Why:  For post-op follow up    Contact information:    [address]  [place] [address] [phone]          Discharge Instructions     Future Orders    Activity as tolerated     Call MD for:  difficulty breathing, headache or visual disturbances     Call MD for:  persistent nausea and vomiting     Call MD for:  severe uncontrolled pain     Call MD for:  temperature >100.4     Diet general     Questions:    Total calories:      Fat restriction, if any:      Protein restriction, if any:      Na restriction, if any:      Fluid restriction:      Additional restrictions:      No dressing needed         Discharge Instructions         ACTIVITY LEVEL: If you have received sedation or an anesthetic, you may feel sleepy for several hours. Rest until you are more awake. Gradually resume your normal activities.       DIET: You may resume your home diet. If nausea is present, increase your diet gradually with fluids and bland foods.      Medications: Pain medication should be taken only if needed and as directed. If antibiotics are prescribed, the medication should be taken until completed. You will be given an updated list of you medications.        CALL THE DOCTOR:       · Fever over 101°F  · Severe pain that doesnt go away with  "medication.  · Upset stomach and vomiting that is persistent.  · Problems urinating-unable to urinate or heavy bleeding (with or without clots)         Discharge References/Attachments     KNOX CATHETER, CARE (ENGLISH)        Primary Diagnosis     Your primary diagnosis was:  Retention Of Urine      Admission Information     Date & Time Provider Department CSN    4/4/2017 11:29 AM Lenore Ramirez MD [facility] [id number]      Care Providers     Provider Role Specialty Primary office phone    Lenore Ramirez MD Attending Provider Urology [phone]    Lenore Ramirez MD Surgeon  Urology [phone]      Your Vitals Were     BP Pulse Temp Resp Height Weight    133/77 (BP Location: Left arm, Patient Position: Sitting, BP Method: Automatic) 76 97.7 °F (36.5 °C) (Oral) 18 6' (1.829 m) 116.1 kg (256 lb)    SpO2 BMI             98% 34.72 kg/m2         Recent Lab Values     No lab values to display.      Allergies as of 4/4/2017        Reactions    Aspirin Other (See Comments)    Nose bleeds     Cholesterol Analogues Other (See Comments)    "stabbing pains in muscles"    Codeine Hallucinations    Metformin Other (See Comments)    "stabbing pain in muscles"    Penicillins       Advance Directives     An advance directive is a document which, in the event you are no longer able to make decisions for yourself, tells your healthcare team what kind of treatment you do or do not want to receive, or who you would like to make those decisions for you.  If you do not currently have an advance directive, [facility] encourages you to create one.  For more information call:  [phone]), [phone] ([phone]),  or log on to [email or web address].        Language Assistance Services     ATTENTION: Language assistance services are available, free of charge. Please call [phone].      ATENCIÓN: Si habla español, tiene a araujo disposición servicios gratuitos de asistencia " lora. Cady rodriguez [phone].     ARIANNA Ý: N?u b?n nói Ti?ng Vi?t, có các d?ch v? h? tr? ngôn ng? mi?n phí dành cho b?n. G?i s? [phone].        [facility]     Activating your [facility] account is as easy as 1-2-3!     1) Visit [email or web address], select Sign Up Now, enter this activation code and your date of birth, then select Next.  [email or web address]  Expires: 4/30/2017 12:07 PM      2) Create a username and password to use when you visit [facility] in the future and select a security question in case you lose your password and select Next.    3) Enter your e-mail address and click Sign Up!    Additional Information  If you have questions, please e-mail [email or web address] or call [phone] to talk to our [facility] staff. Remember, [facility] is NOT to be used for urgent needs. For medical emergencies, dial 911.          [facility] complies with applicable Federal civil rights laws and does not discriminate on the basis of race, color, national origin, age, disability, or sex.

## 2017-04-04 NOTE — TELEPHONE ENCOUNTER
----- Message from Lenore Ramirez MD sent at 4/4/2017  2:10 PM CDT -----  Pt needs appt to discuss UDS findings either later this week or early next. He is currently scheduled for f/u at [facility]. He is a [facility] patient. Thanks.

## 2017-04-06 ENCOUNTER — OFFICE VISIT (OUTPATIENT)
Dept: UROLOGY | Facility: CLINIC | Age: 67
End: 2017-04-06
Attending: UROLOGY
Payer: MEDICARE

## 2017-04-06 VITALS
SYSTOLIC BLOOD PRESSURE: 122 MMHG | DIASTOLIC BLOOD PRESSURE: 74 MMHG | BODY MASS INDEX: 35.35 KG/M2 | WEIGHT: 261 LBS | HEIGHT: 72 IN

## 2017-04-06 DIAGNOSIS — R33.9 URINARY RETENTION: Primary | ICD-10-CM

## 2017-04-06 PROCEDURE — 1157F ADVNC CARE PLAN IN RCRD: CPT | Mod: S$GLB,,, | Performed by: UROLOGY

## 2017-04-06 PROCEDURE — 99999 PR PBB SHADOW E&M-EST. PATIENT-LVL III: CPT | Mod: PBBFAC,,, | Performed by: UROLOGY

## 2017-04-06 PROCEDURE — 81002 URINALYSIS NONAUTO W/O SCOPE: CPT | Mod: S$GLB,,, | Performed by: UROLOGY

## 2017-04-06 PROCEDURE — 1159F MED LIST DOCD IN RCRD: CPT | Mod: S$GLB,,, | Performed by: UROLOGY

## 2017-04-06 PROCEDURE — 1125F AMNT PAIN NOTED PAIN PRSNT: CPT | Mod: S$GLB,,, | Performed by: UROLOGY

## 2017-04-06 PROCEDURE — 99214 OFFICE O/P EST MOD 30 MIN: CPT | Mod: 25,S$GLB,, | Performed by: UROLOGY

## 2017-04-06 PROCEDURE — 1160F RVW MEDS BY RX/DR IN RCRD: CPT | Mod: S$GLB,,, | Performed by: UROLOGY

## 2017-04-06 RX ORDER — MELOXICAM 15 MG/1
TABLET ORAL
COMMUNITY
Start: 2017-04-03 | End: 2017-05-30

## 2017-04-06 RX ORDER — HYDROCODONE BITARTRATE AND ACETAMINOPHEN 7.5; 325 MG/1; MG/1
TABLET ORAL
COMMUNITY
Start: 2017-04-03 | End: 2017-04-20

## 2017-04-06 RX ORDER — CIPROFLOXACIN 250 MG/1
500 TABLET, FILM COATED ORAL
Status: CANCELLED | OUTPATIENT
Start: 2017-04-06

## 2017-04-06 RX ORDER — PEN NEEDLE, DIABETIC 29 G X1/2"
NEEDLE, DISPOSABLE MISCELLANEOUS
COMMUNITY
Start: 2017-04-03

## 2017-04-06 NOTE — H&P
Subjective:       Camilo Kruger is a 67 y.o. male who is an established patient who was self-referred for evaluation of BPH/UR.       He reports dramatic worsening of urinary issues since Saturday. He was seen in Urgent Care and given Bactrim and Cardura 1mg for LUTS. No records available. He reports he is voiding [id number] with very small volume. He is very uncomfortable and distended. He has seen Dr Johnson in the past but was not on BPH medications. He is DM2 and sugars have been high recently since changing to long acting insulin. No A1c on record. Also with constipation, no BM x 4-5 days.    PVR (bladder scan) initial visit - >999cc. Marie catheter was placed.    He underwent void trial last week as RN visit that was successful. Unfortunately, he developed recurrent UR later that day and had marie catheter replaced (>700cc). He remains on Flomax.     Cysto/UDS 4/4/17:  Findings of the Procedure: Mild delayed sensation. No IBC/DO. EDUIN not assessed. Normal capacity bladder. Inability to void with and without P1 in place. Valsalva attempts. Cystoscopy essentially normal except for bilobar hypertrophy with kissing lobes. Uroflow with good flow but incomplete emptying with straining.   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 Bilateral prostatic hypertrophy on cystoscopy. Good flow with suspected Valsalva voiding during uroflow. Recommend TURP though concerns remain for continued UR. May require long term catheterization vs CIC. ?InterStim. Also with back issues - may be related to bladder dysfunction.       The following portions of the patient's history were reviewed and updated as appropriate: allergies, current medications, past family history, past medical history, past social history, past surgical history and problem list.    Review of Systems  Constitutional: no fever or chills  ENT: no nasal congestion or sore throat  Respiratory: no cough or shortness of  breath  Cardiovascular: no chest pain or palpitations  Gastrointestinal: no nausea or vomiting, tolerating diet  Genitourinary: as per HPI  Hematologic/Lymphatic: no easy bruising or lymphadenopathy  Musculoskeletal: no arthralgias or myalgias  Skin: no rashes or lesions  Neurological: no seizures or tremors  Behavioral/Psych: no auditory or visual hallucinations       Objective:    Vitals: /74  Ht 6' (1.829 m)  Wt 118.4 kg (261 lb)  BMI 35.4 kg/m2    Physical Exam   General: well developed, well nourished in no acute distress  Head: normocephalic, atraumatic  Neck: supple, trachea midline, no obvious enlargement of thyroid  HEENT: EOMI, mucus membranes moist, sclera anicteric, no hearing impairment  Lungs: symmetric expansion, non-labored breathing  Cardiovascular: regular rate and rhythm, normal pulses  Abdomen: soft, non tender, non distended, no palpable masses, no hepatosplenomegaly, no hernias, bladder nonpalpable. No CVA tenderness.  Musculoskeletal: no peripheral edema, normal ROM in bilateral upper and lower extremities  Lymphatics: no cervical or inguinal lymphadenopathy  Skin: no rashes or lesions  Neuro: alert and oriented x 3, no gross deficits  Psych: normal judgment and insight, normal mood/affect and non-anxious  Genitourinary:   Penis -  circumcised penis without plaques, lesions, or scarring.  Urethra - orthotopic location without stenosis.  Scrotum  - no lesions or rashes, no hydrocele or hernia.  Testes - descended bilaterally, symmetric without masses, non tender.  Epididymides - no cysts or lesions, no spermatocele, symmetric MARIE IN PLACE   ANNEMARIE: Symmetric 40g prostate without nodularity or tenderness. Normal landmarks. seminal vesicles non palpable, normal sphincter tone without hemorrhoids or rectal masses. Normal appearance of anus and perineum.      Lab Review   Urine analysis today in clinic shows - marie    No results found for: WBC, HGB, HCT, MCV, PLT  No results found for:  CREATININE, BUN  No results found for: PSA  No results found for: PSADIAG    Imaging  NA       Assessment/Plan:      1. Urinary retention    - Very high PVR. Distended/uncomfortable.   - Garcia placed - 1200cc initially   - Continue Flomax   - Start Proscar   - Avoid/treat constipation   - Initially successful VOT but failed later that day. Declines VOT today.   - s/p cysto/UDS on 4/4/17 - atonic bladder, BPH   - Recommend TURP. Understands risk of continued UR. May require long-term catheter, CIC, InterStim in future.   - TURP on 4/21/17       Follow up in  1 week post-op for VOT

## 2017-04-06 NOTE — MR AVS SNAPSHOT
[facility]  [address] [facility] [address]  [place] [address]  Phone: [phone]                  Camilo Kruger   2017 1:00 PM   Office Visit    Description:  Male : 1950   Provider:  Lenore Ramirez MD   Department:  [facility]           Reason for Visit     Post-op Evaluation           Diagnoses this Visit        Comments    Urinary retention    -  Primary            To Do List           Goals (5 Years of Data)     None      Follow-Up and Disposition     Return in about 3 weeks (around 2017) for Post-op, Voiding trial.      [facility]     [facility] Nurse Care Line -  Assistance  Unless otherwise directed by your provider, please contact [facility], our nurse care line that is available for  assistance.     Registered nurses in the [facility] provide: appointment scheduling, clinical advisement, health education, and other advisory services.  Call: [phone] (toll free)               Medications           Message regarding Medications     Verify the changes and/or additions to your medication regime listed below are the same as discussed with your clinician today.  If any of these changes or additions are incorrect, please notify your healthcare provider.             Verify that the below list of medications is an accurate representation of the medications you are currently taking.  If none reported, the list may be blank. If incorrect, please contact your healthcare provider. Carry this list with you in case of emergency.           Current Medications     BD INSULIN SYRINGE ULTRA-FINE 1 mL 31 gauge x 5/16 Syrg     docusate sodium (COLACE) 100 MG capsule TAKE ONE CAPSULE BY MOUTH TWICE A DAY FOR 10 DAYS FOR CONSTIPATION    finasteride (PROSCAR) 5 mg tablet Take 1 tablet (5 mg total) by mouth once daily.    hydrocodone-acetaminophen 7.5-325mg (NORCO) 7.5-325 mg per tablet     meloxicam (MOBIC) 15 MG tablet     NOVOLOG  MIX 70-30 FLEXPEN 100 unit/mL (70-30) InPn pen INEJCT 33 UNITS TWICE A DAY    polyethylene glycol (GLYCOLAX) 17 gram/dose powder MIX 17 GRAM(S) IN JUICE OR WATER DAILY    ranitidine (ZANTAC) 150 MG capsule Take 150 mg by mouth 2 (two) times daily.    simethicone (MYLICON) 80 MG chewable tablet Take 80 mg by mouth every 6 (six) hours as needed for Flatulence.    tamsulosin (FLOMAX) 0.4 mg Cp24 Take 1 capsule (0.4 mg total) by mouth once daily.    tramadol-acetaminophen 37.5-325 mg (ULTRACET) 37.5-325 mg Tab TAKE 1 TABLET BY MOUTH EVERY 12 HOURS AS NEEDED FOR 5 DAYS           Clinical Reference Information           Your Vitals Were     BP Height Weight BMI       122/74 6' (1.829 m) 118.4 kg (261 lb) 35.4 kg/m2       Blood Pressure          Most Recent Value    BP  122/74      Allergies as of 4/6/2017     Aspirin    Cholesterol Analogues    Codeine    Metformin    Penicillins      Immunizations Administered on Date of Encounter - 4/6/2017     None      [facility] Sign-Up     Activating your [facility] account is as easy as 1-2-3!     1) Visit [email or web address], select Sign Up Now, enter this activation code and your date of birth, then select Next.  [email or web address]  Expires: 4/30/2017 12:07 PM      2) Create a username and password to use when you visit [facility] in the future and select a security question in case you lose your password and select Next.    3) Enter your e-mail address and click Sign Up!    Additional Information  If you have questions, please e-mail [email or web address] or call [phone] to talk to our [facility] staff. Remember, [facility] is NOT to be used for urgent needs. For medical emergencies, dial 911.         Language Assistance Services     ATTENTION: Language assistance services are available, free of charge. Please call [phone].      ATENCIÓN: Si habla español, tiene a araujo disposición servicios gratuitos de asistencia lingüística. Llame al [phone].     ARIANNA RODRIGUEZ: N?u b?n nói Ti?ng  Vi?t, có các d?ch v? h? tr? ngôn ng? mi?n phí dành cho b?n. G?i s? [phone].         [facility] complies with applicable Federal civil rights laws and does not discriminate on the basis of race, color, national origin, age, disability, or sex.

## 2017-04-06 NOTE — PROGRESS NOTES
Subjective:       Camilo Kruger is a 67 y.o. male who is an established patient who was self-referred for evaluation of BPH/UR.       He reports dramatic worsening of urinary issues since Saturday. He was seen in Urgent Care and given Bactrim and Cardura 1mg for LUTS. No records available. He reports he is voiding [id number] with very small volume. He is very uncomfortable and distended. He has seen Dr Johnson in the past but was not on BPH medications. He is DM2 and sugars have been high recently since changing to long acting insulin. No A1c on record. Also with constipation, no BM x 4-5 days.    PVR (bladder scan) initial visit - >999cc. Marie catheter was placed.    He underwent void trial last week as RN visit that was successful. Unfortunately, he developed recurrent UR later that day and had marie catheter replaced (>700cc). He remains on Flomax.     Cysto/UDS 4/4/17:  Findings of the Procedure: Mild delayed sensation. No IBC/DO. EDUIN not assessed. Normal capacity bladder. Inability to void with and without P1 in place. Valsalva attempts. Cystoscopy essentially normal except for bilobar hypertrophy with kissing lobes. Uroflow with good flow but incomplete emptying with straining.   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 Bilateral prostatic hypertrophy on cystoscopy. Good flow with suspected Valsalva voiding during uroflow. Recommend TURP though concerns remain for continued UR. May require long term catheterization vs CIC. ?InterStim. Also with back issues - may be related to bladder dysfunction.       The following portions of the patient's history were reviewed and updated as appropriate: allergies, current medications, past family history, past medical history, past social history, past surgical history and problem list.    Review of Systems  Constitutional: no fever or chills  ENT: no nasal congestion or sore throat  Respiratory: no cough or shortness of  breath  Cardiovascular: no chest pain or palpitations  Gastrointestinal: no nausea or vomiting, tolerating diet  Genitourinary: as per HPI  Hematologic/Lymphatic: no easy bruising or lymphadenopathy  Musculoskeletal: no arthralgias or myalgias  Skin: no rashes or lesions  Neurological: no seizures or tremors  Behavioral/Psych: no auditory or visual hallucinations       Objective:    Vitals: /74  Ht 6' (1.829 m)  Wt 118.4 kg (261 lb)  BMI 35.4 kg/m2    Physical Exam   General: well developed, well nourished in no acute distress  Head: normocephalic, atraumatic  Neck: supple, trachea midline, no obvious enlargement of thyroid  HEENT: EOMI, mucus membranes moist, sclera anicteric, no hearing impairment  Lungs: symmetric expansion, non-labored breathing  Cardiovascular: regular rate and rhythm, normal pulses  Abdomen: soft, non tender, non distended, no palpable masses, no hepatosplenomegaly, no hernias, bladder nonpalpable. No CVA tenderness.  Musculoskeletal: no peripheral edema, normal ROM in bilateral upper and lower extremities  Lymphatics: no cervical or inguinal lymphadenopathy  Skin: no rashes or lesions  Neuro: alert and oriented x 3, no gross deficits  Psych: normal judgment and insight, normal mood/affect and non-anxious  Genitourinary:   Penis -  circumcised penis without plaques, lesions, or scarring.  Urethra - orthotopic location without stenosis.  Scrotum  - no lesions or rashes, no hydrocele or hernia.  Testes - descended bilaterally, symmetric without masses, non tender.  Epididymides - no cysts or lesions, no spermatocele, symmetric MARIE IN PLACE   ANNEMARIE: Symmetric 40g prostate without nodularity or tenderness. Normal landmarks. seminal vesicles non palpable, normal sphincter tone without hemorrhoids or rectal masses. Normal appearance of anus and perineum.      Lab Review   Urine analysis today in clinic shows - marie    No results found for: WBC, HGB, HCT, MCV, PLT  No results found for:  CREATININE, BUN  No results found for: PSA  No results found for: PSADIAG    Imaging  NA       Assessment/Plan:      1. Urinary retention    - Very high PVR. Distended/uncomfortable.   - Garcia placed - 1200cc initially   - Continue Flomax   - Start Proscar   - Avoid/treat constipation   - Initially successful VOT but failed later that day. Declines VOT today.   - s/p cysto/UDS on 4/4/17 - atonic bladder, BPH   - Recommend TURP. Understands risk of continued UR. May require long-term catheter, CIC, InterStim in future.   - TURP on 4/21/17       Follow up in  1 week post-op for VOT

## 2017-04-07 LAB
BILIRUB SERPL-MCNC: ABNORMAL MG/DL
BLOOD URINE, POC: 250
COLOR, POC UA: YELLOW
GLUCOSE UR QL STRIP: 50
KETONES UR QL STRIP: ABNORMAL
LEUKOCYTE ESTERASE URINE, POC: ABNORMAL
NITRITE, POC UA: ABNORMAL
PH, POC UA: 5
PROTEIN, POC: ABNORMAL
SPECIFIC GRAVITY, POC UA: 1025
UROBILINOGEN, POC UA: ABNORMAL

## 2017-04-20 ENCOUNTER — HOSPITAL ENCOUNTER (OUTPATIENT)
Dept: PREADMISSION TESTING | Facility: HOSPITAL | Age: 67
Discharge: HOME | End: 2017-04-20
Attending: UROLOGY
Payer: MEDICARE

## 2017-04-20 VITALS
BODY MASS INDEX: 34.1 KG/M2 | HEIGHT: 73 IN | WEIGHT: 257.31 LBS | DIASTOLIC BLOOD PRESSURE: 76 MMHG | RESPIRATION RATE: 20 BRPM | SYSTOLIC BLOOD PRESSURE: 135 MMHG | HEART RATE: 66 BPM | TEMPERATURE: 98 F

## 2017-04-20 DIAGNOSIS — R33.9 URINARY RETENTION: Primary | ICD-10-CM

## 2017-04-20 LAB
ANION GAP SERPL CALC-SCNC: 8 MMOL/L
BASOPHILS # BLD AUTO: 0.04 K/UL
BASOPHILS NFR BLD: 0.7 %
BUN SERPL-MCNC: 17 MG/DL
CALCIUM SERPL-MCNC: 9.6 MG/DL
CHLORIDE SERPL-SCNC: 105 MMOL/L
CO2 SERPL-SCNC: 28 MMOL/L
CREAT SERPL-MCNC: 0.9 MG/DL
DIFFERENTIAL METHOD: ABNORMAL
EOSINOPHIL # BLD AUTO: 0 K/UL
EOSINOPHIL NFR BLD: 0.5 %
ERYTHROCYTE [DISTWIDTH] IN BLOOD BY AUTOMATED COUNT: 14.8 %
EST. GFR  (AFRICAN AMERICAN): >60 ML/MIN/1.73 M^2
EST. GFR  (NON AFRICAN AMERICAN): >60 ML/MIN/1.73 M^2
GLUCOSE SERPL-MCNC: 125 MG/DL
HCT VFR BLD AUTO: 42.8 %
HGB BLD-MCNC: 14.2 G/DL
LYMPHOCYTES # BLD AUTO: 1.7 K/UL
LYMPHOCYTES NFR BLD: 28.4 %
MCH RBC QN AUTO: 28.2 PG
MCHC RBC AUTO-ENTMCNC: 33.2 %
MCV RBC AUTO: 85 FL
MONOCYTES # BLD AUTO: 0.5 K/UL
MONOCYTES NFR BLD: 8 %
NEUTROPHILS # BLD AUTO: 3.6 K/UL
NEUTROPHILS NFR BLD: 62.2 %
PLATELET # BLD AUTO: 215 K/UL
PMV BLD AUTO: 10.4 FL
POTASSIUM SERPL-SCNC: 4.7 MMOL/L
RBC # BLD AUTO: 5.04 M/UL
SODIUM SERPL-SCNC: 141 MMOL/L
WBC # BLD AUTO: 5.85 K/UL

## 2017-04-20 PROCEDURE — 85025 COMPLETE CBC W/AUTO DIFF WBC: CPT

## 2017-04-20 PROCEDURE — 36415 COLL VENOUS BLD VENIPUNCTURE: CPT

## 2017-04-20 PROCEDURE — 93005 ELECTROCARDIOGRAM TRACING: CPT

## 2017-04-20 PROCEDURE — 80048 BASIC METABOLIC PNL TOTAL CA: CPT

## 2017-04-20 NOTE — PLAN OF CARE
Pre-operative instructions, medication directives and pain scales reviewed with patient Mr Kruger. All questions the patient had  were answered. Re-assurance about surgical procedure and day of surgery routine given as needed. Mr Kruger verbalized understanding of the pre-op instructions.

## 2017-04-20 NOTE — DISCHARGE INSTRUCTIONS
Your surgery is scheduled for __FRIDAY 4/21__________________.    .      Please report to SAME DAY SURGERY UNIT on the 2nd FLOOR at _1030______ a.m.          INSTRUCTIONS IMPORTANT!!!    X¨ Do not eat or drink after 12 midnight-including water. OK to brush teeth, no   gum, candy or mints!            _X___  Prep instructions:    SHOWER   OTHER  ______________________    _X___  No shaving of procedural area at least 4-5 days before surgery due to                        increased risk of skin irritation and/or possible infection.    _X___  No powder, lotions or creams to your body.  _X___  You may wear only deodorant on the day of surgery.  _X___  Please remove all jewelry, including piercings and leave at home.  _X___  No money or valuables needed. Please leave at home.  You may bring your           cell phone.  .  __X__  If going home the same day, arrange for a ride home. You will not be able to   drive if Anesthesia was used.    _X___  Wear loose fitting clothing. Allow for dressings, bandages.  __X__  Stop Aspirin, Ibuprofen, Motrin and Aleve at least 3-5 days before                       surgery, unless otherwise instructed by your doctor, or the nurse.              You MAY use Tylenol/acetaminophen until day of surgery.  __X__  If you take diabetic medication, do not take am of surgery unless instructed by   Doctor.  _X___  Call MD for temperature above 101 degrees.        _X___ Stop taking any Fish Oil supplement or any Vitamins that contain Vitamin                  E at least 5 days prior to surgery.          I have read or had read and explained to me, and understand the above information.    Additional comments or instructions:Please call   [phone] if you have any questions regarding the instructions above.==ADORE

## 2017-04-20 NOTE — IP AVS SNAPSHOT
[place] [address] [place] [address]  Phone: [phone]           Patient Discharge Instructions  Our goal is to set you up for success. This packet includes information on your condition, medications, and your home care. It will help you care for yourself to prevent having to return to the hospital.     Please ask your nurse if you have any questions.      There are many details to remember when preparing for your surgery. Here is what you will need to do, please ask your nurse if there are more specific instructions and if you have any questions:    1. Before procedure Do not smoke or drink alcoholic beverages 24 hours prior to your procedure. Do not eat or drink anything 8 hours before your procedure - this includes gum, mints, and candy.     2. Day of procedure Please remove all jewelry for the procedure. If you wear contact lenses, dentures, hearing aids or glasses, bring a container to put them in during your surgery and give to a family member.  If your doctor has scheduled you for an overnight stay, bring a small overnight bag with any personal items that you need.      3. After procedure  Make arrangements in advance for transportation home by a responsible adult. It is not safe to drive a vehicle during the 24 hours following surgery.     PLEASE NOTE: You may be contacted the day before your surgery to confirm your surgery date and arrival time. The Surgery schedule has many variables which may affect the time of your surgery case. Family members should be available if your surgery time changes.           [facility]  Unless otherwise directed by your provider, please   contact [facility], our nurse care line   that is available for 24/7 assistance.     [phone] (toll-free)     Registered nurses in the [facility]   provide: appointment scheduling, clinical advisement, health education, and other advisory services.                  ** Verify  the list of medication(s) below is accurate and up to date. Carry this with you in case of emergency. If your medications have changed, please notify your healthcare provider.             Medication List      TAKE these medications        Additional Info                      BD INSULIN SYRINGE ULTRA-FINE 1 mL 31 gauge x 5/16 Syrg   Refills:  0   Generic drug:  insulin syringe-needle U-100      Begin Date    AM    Noon    PM    Bedtime       docusate sodium 100 MG capsule   Commonly known as:  COLACE   Refills:  0    Instructions:  TAKE ONE CAPSULE BY MOUTH TWICE A DAY FOR 10 DAYS FOR CONSTIPATION     Begin Date    AM    Noon    PM    Bedtime       finasteride 5 mg tablet   Commonly known as:  PROSCAR   Quantity:  30 tablet   Refills:  11   Dose:  5 mg    Instructions:  Take 1 tablet (5 mg total) by mouth once daily.     Begin Date    AM    Noon    PM    Bedtime       meloxicam 15 MG tablet   Commonly known as:  MOBIC   Refills:  0      Begin Date    AM    Noon    PM    Bedtime       NOVOLOG MIX 70-30 FLEXPEN 100 unit/mL (70-30) Inpn pen   Refills:  2   Generic drug:  insulin aspart protamine-insulin aspart    Instructions:  INEJCT 33 UNITS TWICE A DAY     Begin Date    AM    Noon    PM    Bedtime       polyethylene glycol 17 gram/dose powder   Commonly known as:  GLYCOLAX   Refills:  1    Instructions:  MIX 17 GRAM(S) IN JUICE OR WATER DAILY     Begin Date    AM    Noon    PM    Bedtime       ranitidine 150 MG capsule   Commonly known as:  ZANTAC   Refills:  0   Dose:  150 mg    Instructions:  Take 150 mg by mouth 2 (two) times daily.     Begin Date    AM    Noon    PM    Bedtime       simethicone 80 MG chewable tablet   Commonly known as:  MYLICON   Refills:  0   Dose:  80 mg    Instructions:  Take 80 mg by mouth every 6 (six) hours as needed for Flatulence.     Begin Date    AM    Noon    PM    Bedtime       tamsulosin 0.4 mg Cp24   Commonly known as:  FLOMAX   Quantity:  30 capsule   Refills:  11   Dose:  0.4 mg     Instructions:  Take 1 capsule (0.4 mg total) by mouth once daily.     Begin Date    AM    Noon    PM    Bedtime                  Please bring to all follow up appointments:    1. A copy of your discharge instructions.  2. All medicines you are currently taking in their original bottles.  3. Identification and insurance card.    Please arrive 15 minutes ahead of scheduled appointment time.    Please call 24 hours in advance if you must reschedule your appointment and/or time.        Your Scheduled Appointments     Apr 27, 2017  9:00 AM CDT   Post OP with Lenore Ramirez MD   [facility]    [address] [facility] [address]  [place] [address]   [phone]              Your Future Surgeries/Procedures     Apr 21, 2017   Surgery with Lenore Ramirez MD   [facility]    [address] [address]   [phone]                  Discharge Instructions         Your surgery is scheduled for __FRIDAY 4/21__________________.    .      Please report to SAME DAY SURGERY UNIT on the 2nd FLOOR at _1030______ a.m.          INSTRUCTIONS IMPORTANT!!!    X¨ Do not eat or drink after 12 midnight-including water. OK to brush teeth, no   gum, candy or mints!            _X___  Prep instructions:    SHOWER   OTHER  ______________________    _X___  No shaving of procedural area at least 4-5 days before surgery due to                        increased risk of skin irritation and/or possible infection.    _X___  No powder, lotions or creams to your body.  _X___  You may wear only deodorant on the day of surgery.  _X___  Please remove all jewelry, including piercings and leave at home.  _X___  No money or valuables needed. Please leave at home.  You may bring your           cell phone.  .  __X__  If going home the same day, arrange for a ride home. You will not be able to   drive if Anesthesia was used.    _X___  Wear loose fitting  "clothing. Allow for dressings, bandages.  __X__  Stop Aspirin, Ibuprofen, Motrin and Aleve at least 3-5 days before                       surgery, unless otherwise instructed by your doctor, or the nurse.              You MAY use Tylenol/acetaminophen until day of surgery.  __X__  If you take diabetic medication, do not take am of surgery unless instructed by   Doctor.  _X___  Call MD for temperature above 101 degrees.        _X___ Stop taking any Fish Oil supplement or any Vitamins that contain Vitamin                  E at least 5 days prior to surgery.          I have read or had read and explained to me, and understand the above information.    Additional comments or instructions:Please call   [phone] if you have any questions regarding the instructions above.==ADORE                 Admission Information     Date & Time Provider Department CSN    4/20/2017 10:30 AM Lenore Ramirez MD [facility] [id number]      Care Providers     Provider Role Specialty Primary office phone    Lenore Ramirez MD Attending Provider Urology [phone]      Recent Lab Values     No lab values to display.      Allergies as of 4/20/2017        Reactions    Aspirin Other (See Comments)    Nose bleeds     Cholesterol Analogues Other (See Comments)    "stabbing pains in muscles"    Codeine Hallucinations    Metformin Other (See Comments)    "stabbing pain in muscles"    Penicillins       Advance Directives     An advance directive is a document which, in the event you are no longer able to make decisions for yourself, tells your healthcare team what kind of treatment you do or do not want to receive, or who you would like to make those decisions for you.  If you do not currently have an advance directive, [facility] encourages you to create one.  For more information call:  [phone]), [phone] ([phone]),  or log on to [email or web address].        Language Assistance Services     " ATTENTION: Language assistance services are available, free of charge. Please call [phone].      ATENCIÓN: Si marlene arzate, tiene a araujo disposición servicios gratuitos de asistencia lingüística. Llame al [phone].     [facility] Ý: N?u b?n nói Ti?ng Vi?t, có các d?ch v? h? tr? ngôn ng? mi?n phí dành cho b?n. G?i s? [phone].        [facility] Sign-Up     Activating your [facility] account is as easy as 1-2-3!     1) Visit [employer] [email or web address], select Sign Up Now, enter this activation code and your date of birth, then select Next.  [email or web address]  Expires: 4/30/2017 12:07 PM      2) Create a username and password to use when you visit [facility] in the future and select a security question in case you lose your password and select Next.    3) Enter your e-mail address and click Sign Up!    Additional Information  If you have questions, please e-mail [email or web address] or call [phone] to talk to our [facility] staff. Remember, [facility] is NOT to be used for urgent needs. For medical emergencies, dial 911.          [facility] complies with applicable Federal civil rights laws and does not discriminate on the basis of race, color, national origin, age, disability, or sex.

## 2017-04-21 ENCOUNTER — SURGERY (OUTPATIENT)
Age: 67
End: 2017-04-21

## 2017-04-21 ENCOUNTER — ANESTHESIA EVENT (OUTPATIENT)
Dept: SURGERY | Facility: HOSPITAL | Age: 67
End: 2017-04-21
Payer: MEDICARE

## 2017-04-21 ENCOUNTER — ANESTHESIA (OUTPATIENT)
Dept: SURGERY | Facility: HOSPITAL | Age: 67
End: 2017-04-21
Payer: MEDICARE

## 2017-04-21 ENCOUNTER — HOSPITAL ENCOUNTER (OUTPATIENT)
Facility: HOSPITAL | Age: 67
Discharge: HOME | End: 2017-04-21
Attending: UROLOGY | Admitting: UROLOGY
Payer: MEDICARE

## 2017-04-21 VITALS
HEART RATE: 71 BPM | DIASTOLIC BLOOD PRESSURE: 70 MMHG | BODY MASS INDEX: 34.1 KG/M2 | SYSTOLIC BLOOD PRESSURE: 124 MMHG | RESPIRATION RATE: 20 BRPM | OXYGEN SATURATION: 97 % | WEIGHT: 257.31 LBS | TEMPERATURE: 98 F | HEIGHT: 73 IN

## 2017-04-21 DIAGNOSIS — R33.9 URINARY RETENTION: Primary | ICD-10-CM

## 2017-04-21 LAB
POCT GLUCOSE: 132 MG/DL (ref 70–110)
POCT GLUCOSE: 145 MG/DL (ref 70–110)

## 2017-04-21 PROCEDURE — 37000008 HC ANESTHESIA 1ST 15 MINUTES: Performed by: UROLOGY

## 2017-04-21 PROCEDURE — D9220A PRA ANESTHESIA: Mod: ANES,,, | Performed by: ANESTHESIOLOGY

## 2017-04-21 PROCEDURE — 36000707: Performed by: UROLOGY

## 2017-04-21 PROCEDURE — 88305 TISSUE EXAM BY PATHOLOGIST: CPT | Performed by: PATHOLOGY

## 2017-04-21 PROCEDURE — 36000706: Performed by: UROLOGY

## 2017-04-21 PROCEDURE — 71000016 HC POSTOP RECOV ADDL HR: Performed by: UROLOGY

## 2017-04-21 PROCEDURE — 71000039 HC RECOVERY, EACH ADD'L HOUR: Performed by: UROLOGY

## 2017-04-21 PROCEDURE — 25000003 PHARM REV CODE 250: Performed by: NURSE ANESTHETIST, CERTIFIED REGISTERED

## 2017-04-21 PROCEDURE — D9220A PRA ANESTHESIA: Mod: CRNA,,, | Performed by: NURSE ANESTHETIST, CERTIFIED REGISTERED

## 2017-04-21 PROCEDURE — 88305 TISSUE EXAM BY PATHOLOGIST: CPT | Mod: 26,,, | Performed by: PATHOLOGY

## 2017-04-21 PROCEDURE — 52601 PROSTATECTOMY (TURP): CPT | Mod: ,,, | Performed by: UROLOGY

## 2017-04-21 PROCEDURE — 63600175 PHARM REV CODE 636 W HCPCS: Performed by: NURSE ANESTHETIST, CERTIFIED REGISTERED

## 2017-04-21 PROCEDURE — 37000009 HC ANESTHESIA EA ADD 15 MINS: Performed by: UROLOGY

## 2017-04-21 PROCEDURE — 25000003 PHARM REV CODE 250: Performed by: UROLOGY

## 2017-04-21 PROCEDURE — 25000003 PHARM REV CODE 250: Performed by: ANESTHESIOLOGY

## 2017-04-21 PROCEDURE — 71000015 HC POSTOP RECOV 1ST HR: Performed by: UROLOGY

## 2017-04-21 PROCEDURE — 27201423 OPTIME MED/SURG SUP & DEVICES STERILE SUPPLY: Performed by: UROLOGY

## 2017-04-21 PROCEDURE — 71000033 HC RECOVERY, INTIAL HOUR: Performed by: UROLOGY

## 2017-04-21 RX ORDER — HYDROMORPHONE HYDROCHLORIDE 2 MG/ML
0.5 INJECTION, SOLUTION INTRAMUSCULAR; INTRAVENOUS; SUBCUTANEOUS EVERY 4 HOURS PRN
Status: DISCONTINUED | OUTPATIENT
Start: 2017-04-21 | End: 2017-04-22 | Stop reason: HOSPADM

## 2017-04-21 RX ORDER — SODIUM CHLORIDE 0.9 % (FLUSH) 0.9 %
3 SYRINGE (ML) INJECTION EVERY 8 HOURS
Status: DISCONTINUED | OUTPATIENT
Start: 2017-04-21 | End: 2017-04-22 | Stop reason: HOSPADM

## 2017-04-21 RX ORDER — CIPROFLOXACIN 500 MG/1
500 TABLET ORAL 2 TIMES DAILY
Qty: 4 TABLET | Refills: 0 | Status: SHIPPED | OUTPATIENT
Start: 2017-04-21 | End: 2017-04-23

## 2017-04-21 RX ORDER — HYDROCODONE BITARTRATE AND ACETAMINOPHEN 5; 325 MG/1; MG/1
1 TABLET ORAL EVERY 4 HOURS PRN
Status: DISCONTINUED | OUTPATIENT
Start: 2017-04-21 | End: 2017-04-22 | Stop reason: HOSPADM

## 2017-04-21 RX ORDER — MEPERIDINE HYDROCHLORIDE 50 MG/ML
12.5 INJECTION INTRAMUSCULAR; INTRAVENOUS; SUBCUTANEOUS ONCE AS NEEDED
Status: DISCONTINUED | OUTPATIENT
Start: 2017-04-21 | End: 2017-04-22 | Stop reason: HOSPADM

## 2017-04-21 RX ORDER — ACETAMINOPHEN 325 MG/1
650 TABLET ORAL EVERY 4 HOURS PRN
Status: DISCONTINUED | OUTPATIENT
Start: 2017-04-21 | End: 2017-04-22 | Stop reason: HOSPADM

## 2017-04-21 RX ORDER — SODIUM CHLORIDE, SODIUM LACTATE, POTASSIUM CHLORIDE, CALCIUM CHLORIDE 600; 310; 30; 20 MG/100ML; MG/100ML; MG/100ML; MG/100ML
INJECTION, SOLUTION INTRAVENOUS CONTINUOUS
Status: DISCONTINUED | OUTPATIENT
Start: 2017-04-21 | End: 2017-04-22 | Stop reason: HOSPADM

## 2017-04-21 RX ORDER — SODIUM CHLORIDE 0.9 % (FLUSH) 0.9 %
3 SYRINGE (ML) INJECTION
Status: DISCONTINUED | OUTPATIENT
Start: 2017-04-21 | End: 2017-04-22 | Stop reason: HOSPADM

## 2017-04-21 RX ORDER — ONDANSETRON 2 MG/ML
INJECTION INTRAMUSCULAR; INTRAVENOUS
Status: DISCONTINUED | OUTPATIENT
Start: 2017-04-21 | End: 2017-04-21

## 2017-04-21 RX ORDER — PROPOFOL 10 MG/ML
VIAL (ML) INTRAVENOUS
Status: DISCONTINUED | OUTPATIENT
Start: 2017-04-21 | End: 2017-04-21

## 2017-04-21 RX ORDER — FENTANYL CITRATE 50 UG/ML
25 INJECTION, SOLUTION INTRAMUSCULAR; INTRAVENOUS EVERY 5 MIN PRN
Status: DISCONTINUED | OUTPATIENT
Start: 2017-04-21 | End: 2017-04-22 | Stop reason: HOSPADM

## 2017-04-21 RX ORDER — HYDROMORPHONE HYDROCHLORIDE 2 MG/ML
0.2 INJECTION, SOLUTION INTRAMUSCULAR; INTRAVENOUS; SUBCUTANEOUS EVERY 5 MIN PRN
Status: DISCONTINUED | OUTPATIENT
Start: 2017-04-21 | End: 2017-04-22 | Stop reason: HOSPADM

## 2017-04-21 RX ORDER — LORAZEPAM 2 MG/ML
0.25 INJECTION INTRAMUSCULAR ONCE AS NEEDED
Status: DISCONTINUED | OUTPATIENT
Start: 2017-04-21 | End: 2017-04-22 | Stop reason: HOSPADM

## 2017-04-21 RX ORDER — MIDAZOLAM HYDROCHLORIDE 1 MG/ML
INJECTION, SOLUTION INTRAMUSCULAR; INTRAVENOUS
Status: DISCONTINUED | OUTPATIENT
Start: 2017-04-21 | End: 2017-04-21

## 2017-04-21 RX ORDER — OXYCODONE AND ACETAMINOPHEN 5; 325 MG/1; MG/1
1 TABLET ORAL EVERY 4 HOURS PRN
Qty: 20 TABLET | Refills: 0 | Status: SHIPPED | OUTPATIENT
Start: 2017-04-21 | End: 2017-05-30

## 2017-04-21 RX ORDER — LIDOCAINE HCL/PF 100 MG/5ML
SYRINGE (ML) INTRAVENOUS
Status: DISCONTINUED | OUTPATIENT
Start: 2017-04-21 | End: 2017-04-21

## 2017-04-21 RX ORDER — ROCURONIUM BROMIDE 10 MG/ML
INJECTION, SOLUTION INTRAVENOUS
Status: DISCONTINUED | OUTPATIENT
Start: 2017-04-21 | End: 2017-04-21

## 2017-04-21 RX ORDER — CIPROFLOXACIN 500 MG/1
500 TABLET ORAL
Status: COMPLETED | OUTPATIENT
Start: 2017-04-21 | End: 2017-04-21

## 2017-04-21 RX ORDER — DIPHENHYDRAMINE HYDROCHLORIDE 50 MG/ML
25 INJECTION INTRAMUSCULAR; INTRAVENOUS EVERY 6 HOURS PRN
Status: DISCONTINUED | OUTPATIENT
Start: 2017-04-21 | End: 2017-04-22 | Stop reason: HOSPADM

## 2017-04-21 RX ORDER — PHENYLEPHRINE HYDROCHLORIDE 10 MG/ML
INJECTION INTRAVENOUS
Status: DISCONTINUED | OUTPATIENT
Start: 2017-04-21 | End: 2017-04-21

## 2017-04-21 RX ORDER — FENTANYL CITRATE 50 UG/ML
INJECTION, SOLUTION INTRAMUSCULAR; INTRAVENOUS
Status: DISCONTINUED | OUTPATIENT
Start: 2017-04-21 | End: 2017-04-21

## 2017-04-21 RX ORDER — OXYCODONE AND ACETAMINOPHEN 5; 325 MG/1; MG/1
1 TABLET ORAL
Status: DISCONTINUED | OUTPATIENT
Start: 2017-04-21 | End: 2017-04-22 | Stop reason: HOSPADM

## 2017-04-21 RX ORDER — GLYCOPYRROLATE 0.2 MG/ML
INJECTION INTRAMUSCULAR; INTRAVENOUS
Status: DISCONTINUED | OUTPATIENT
Start: 2017-04-21 | End: 2017-04-21

## 2017-04-21 RX ORDER — METOCLOPRAMIDE HYDROCHLORIDE 5 MG/ML
INJECTION INTRAMUSCULAR; INTRAVENOUS
Status: DISCONTINUED | OUTPATIENT
Start: 2017-04-21 | End: 2017-04-21

## 2017-04-21 RX ORDER — METOCLOPRAMIDE HYDROCHLORIDE 5 MG/ML
10 INJECTION INTRAMUSCULAR; INTRAVENOUS EVERY 10 MIN PRN
Status: DISCONTINUED | OUTPATIENT
Start: 2017-04-21 | End: 2017-04-22 | Stop reason: HOSPADM

## 2017-04-21 RX ORDER — ONDANSETRON 2 MG/ML
4 INJECTION INTRAMUSCULAR; INTRAVENOUS EVERY 12 HOURS PRN
Status: DISCONTINUED | OUTPATIENT
Start: 2017-04-21 | End: 2017-04-22 | Stop reason: HOSPADM

## 2017-04-21 RX ORDER — DOCUSATE SODIUM 100 MG/1
100 CAPSULE, LIQUID FILLED ORAL 2 TIMES DAILY
Qty: 30 CAPSULE | Refills: 0 | Status: SHIPPED | OUTPATIENT
Start: 2017-04-21

## 2017-04-21 RX ORDER — NEOSTIGMINE METHYLSULFATE 1 MG/ML
INJECTION, SOLUTION INTRAVENOUS
Status: DISCONTINUED | OUTPATIENT
Start: 2017-04-21 | End: 2017-04-21

## 2017-04-21 RX ORDER — ONDANSETRON 2 MG/ML
4 INJECTION INTRAMUSCULAR; INTRAVENOUS EVERY 8 HOURS PRN
Status: DISCONTINUED | OUTPATIENT
Start: 2017-04-21 | End: 2017-04-22 | Stop reason: HOSPADM

## 2017-04-21 RX ADMIN — PROPOFOL 170 MG: 10 INJECTION, EMULSION INTRAVENOUS at 12:04

## 2017-04-21 RX ADMIN — PHENYLEPHRINE HYDROCHLORIDE 100 MCG: 10 INJECTION INTRAVENOUS at 01:04

## 2017-04-21 RX ADMIN — ROCURONIUM BROMIDE 10 MG: 10 INJECTION, SOLUTION INTRAVENOUS at 01:04

## 2017-04-21 RX ADMIN — FENTANYL CITRATE 100 MCG: 50 INJECTION INTRAMUSCULAR; INTRAVENOUS at 12:04

## 2017-04-21 RX ADMIN — CIPROFLOXACIN HYDROCHLORIDE 500 MG: 500 TABLET, FILM COATED ORAL at 12:04

## 2017-04-21 RX ADMIN — GLYCOPYRROLATE 0.2 MG: 0.2 INJECTION, SOLUTION INTRAMUSCULAR; INTRAVENOUS at 12:04

## 2017-04-21 RX ADMIN — ROCURONIUM BROMIDE 40 MG: 10 INJECTION, SOLUTION INTRAVENOUS at 12:04

## 2017-04-21 RX ADMIN — ROCURONIUM BROMIDE 10 MG: 10 INJECTION, SOLUTION INTRAVENOUS at 12:04

## 2017-04-21 RX ADMIN — ONDANSETRON 4 MG: 2 INJECTION, SOLUTION INTRAMUSCULAR; INTRAVENOUS at 12:04

## 2017-04-21 RX ADMIN — NEOSTIGMINE METHYLSULFATE 5 MG: 1 INJECTION INTRAVENOUS at 01:04

## 2017-04-21 RX ADMIN — METOCLOPRAMIDE 10 MG: 5 INJECTION, SOLUTION INTRAMUSCULAR; INTRAVENOUS at 12:04

## 2017-04-21 RX ADMIN — SODIUM CHLORIDE, SODIUM LACTATE, POTASSIUM CHLORIDE, AND CALCIUM CHLORIDE: .6; .31; .03; .02 INJECTION, SOLUTION INTRAVENOUS at 11:04

## 2017-04-21 RX ADMIN — GLYCOPYRROLATE 0.6 MG: 0.2 INJECTION, SOLUTION INTRAMUSCULAR; INTRAVENOUS at 01:04

## 2017-04-21 RX ADMIN — LIDOCAINE HYDROCHLORIDE 100 MG: 20 INJECTION, SOLUTION INTRAVENOUS at 12:04

## 2017-04-21 RX ADMIN — MIDAZOLAM HYDROCHLORIDE 2 MG: 1 INJECTION, SOLUTION INTRAMUSCULAR; INTRAVENOUS at 12:04

## 2017-04-21 NOTE — TRANSFER OF CARE
"Anesthesia Transfer of Care Note    Patient: Camilo Kruger    Procedure(s) Performed: Procedure(s) (LRB):  TURP WITH BIPOLAR (N/A)    Patient location: PACU    Anesthesia Type: general    Transport from OR: Transported from OR on room air with adequate spontaneous ventilation    Post pain: adequate analgesia    Post assessment: no apparent anesthetic complications and tolerated procedure well    Post vital signs: stable    Level of consciousness: sedated and responds to stimulation    Nausea/Vomiting: no nausea/vomiting    Complications: none          Last vitals:   Visit Vitals    BP (!) 158/80 (BP Location: Right arm, BP Method: Automatic)    Pulse 78    Temp 36.7 °C (98.1 °F) (Oral)    Resp 18    Ht 6' 1" (1.854 m)    Wt 116.7 kg (257 lb 5 oz)    SpO2 97%    BMI 33.95 kg/m2     "

## 2017-04-21 NOTE — IP AVS SNAPSHOT
[place] [address] [address] [place] [address]  Phone: [phone]           Patient Discharge Instructions   Our goal is to set you up for success. This packet includes information on your condition, medications, and your home care.  It will help you care for yourself to prevent having to return to the hospital.     Please ask your nurse if you have any questions.      There are many details to remember when preparing to leave the hospital. Here is what you will need to do:    1. Take your medicine. If you are prescribed medications, review your Medication List on the following pages. You may have new medications to  at the pharmacy and others that you'll need to stop taking. Review the instructions for how and when to take your medications. Talk with your doctor or nurses if you are unsure of what to do.     2. Go to your follow-up appointments. Specific follow-up information is listed in the following pages. Your may be contacted by a nurse or clinical provider about future appointments. Be sure we have all of the phone numbers to reach you. Please contact your provider's office if you are unable to make an appointment.     3. Watch for warning signs. Your doctor or nurse will give you detailed warning signs to watch for and when to call for assistance. These instructions may also include educational information about your condition. If you experience any of warning signs to your health, call your doctor.           [facility]  Unless otherwise directed by your provider, please   contact [facility], our nurse care line   that is available for 24/7 assistance.     [phone] (toll-free)     Registered nurses in the [facility]   provide: appointment scheduling, clinical advisement, health education, and other advisory services.                  ** Verify the list of medication(s) below is accurate and up to date. Carry this with you in case of emergency.  If your medications have changed, please notify your healthcare provider.             Medication List      START taking these medications        Additional Info                      ciprofloxacin HCl 500 MG tablet   Commonly known as:  CIPRO   Quantity:  4 tablet   Refills:  0   Dose:  500 mg    Last time this was given:  500 mg on 4/21/2017 12:11 PM   Instructions:  Take 1 tablet (500 mg total) by mouth 2 (two) times daily.     Begin Date    AM    Noon    PM    Bedtime       oxycodone-acetaminophen 5-325 mg per tablet   Commonly known as:  PERCOCET   Quantity:  20 tablet   Refills:  0   Dose:  1 tablet    Instructions:  Take 1 tablet by mouth every 4 (four) hours as needed for Pain.     Begin Date    AM    Noon    PM    Bedtime         CHANGE how you take these medications        Additional Info                      * docusate sodium 100 MG capsule   Commonly known as:  COLACE   Refills:  0   What changed:  Another medication with the same name was added. Make sure you understand how and when to take each.    Instructions:  TAKE ONE CAPSULE BY MOUTH TWICE A DAY FOR 10 DAYS FOR CONSTIPATION     Begin Date    AM    Noon    PM    Bedtime       * docusate sodium 100 MG capsule   Commonly known as:  COLACE   Quantity:  30 capsule   Refills:  0   Dose:  100 mg   What changed:  You were already taking a medication with the same name, and this prescription was added. Make sure you understand how and when to take each.    Instructions:  Take 1 capsule (100 mg total) by mouth 2 (two) times daily.     Begin Date    AM    Noon    PM    Bedtime       * Notice:  This list has 2 medication(s) that are the same as other medications prescribed for you. Read the directions carefully, and ask your doctor or other care provider to review them with you.      CONTINUE taking these medications        Additional Info                      BD INSULIN SYRINGE ULTRA-FINE 1 mL 31 gauge x 5/16 Syrg   Refills:  0   Generic drug:  insulin  syringe-needle U-100      Begin Date    AM    Noon    PM    Bedtime       finasteride 5 mg tablet   Commonly known as:  PROSCAR   Quantity:  30 tablet   Refills:  11   Dose:  5 mg    Instructions:  Take 1 tablet (5 mg total) by mouth once daily.     Begin Date    AM    Noon    PM    Bedtime       meloxicam 15 MG tablet   Commonly known as:  MOBIC   Refills:  0      Begin Date    AM    Noon    PM    Bedtime       NOVOLOG MIX 70-30 FLEXPEN 100 unit/mL (70-30) Inpn pen   Refills:  2   Generic drug:  insulin aspart protamine-insulin aspart    Instructions:  INEJCT 33 UNITS TWICE A DAY     Begin Date    AM    Noon    PM    Bedtime       polyethylene glycol 17 gram/dose powder   Commonly known as:  GLYCOLAX   Refills:  1    Instructions:  MIX 17 GRAM(S) IN JUICE OR WATER DAILY     Begin Date    AM    Noon    PM    Bedtime       ranitidine 150 MG capsule   Commonly known as:  ZANTAC   Refills:  0   Dose:  150 mg    Instructions:  Take 150 mg by mouth 2 (two) times daily.     Begin Date    AM    Noon    PM    Bedtime       simethicone 80 MG chewable tablet   Commonly known as:  MYLICON   Refills:  0   Dose:  80 mg    Instructions:  Take 80 mg by mouth every 6 (six) hours as needed for Flatulence.     Begin Date    AM    Noon    PM    Bedtime       tamsulosin 0.4 mg Cp24   Commonly known as:  FLOMAX   Quantity:  30 capsule   Refills:  11   Dose:  0.4 mg    Instructions:  Take 1 capsule (0.4 mg total) by mouth once daily.     Begin Date    AM    Noon    PM    Bedtime            Where to Get Your Medications      These medications were sent to [facility]/pharmacy #[id number] - [place] - [address]  [address] [address]     Phone:  [phone]     ciprofloxacin HCl 500 MG tablet    docusate sodium 100 MG capsule    oxycodone-acetaminophen 5-325 mg per tablet                  Please bring to all follow up appointments:    1. A copy of your discharge instructions.  2. All medicines you are currently taking in  their original bottles.  3. Identification and insurance card.    Please arrive 15 minutes ahead of scheduled appointment time.    Please call 24 hours in advance if you must reschedule your appointment and/or time.        Your Scheduled Appointments     Apr 27, 2017  9:00 AM CDT   Post OP with Lenore Ramirez MD   [facility] ([facility])    [address] [facility] [address]  [facility] [address] [phone]              Follow-up Information     Follow up with Lenore Ramirez MD. Call in 1 week.    Specialty:  Urology    Why:  For post-op follow up and voiding trial, For Follow-up Post-op Appointment= Call Mon. am to make or verify your appointment.    Contact information:    [address] [place] [address]  [address]  [facility] [address]  [phone]          Discharge Instructions     Future Orders    Call MD for:  difficulty breathing, headache or visual disturbances     Call MD for:  persistent nausea and vomiting     Call MD for:  severe uncontrolled pain     Call MD for:  temperature >100.4     Diet general     Questions:    Total calories:      Fat restriction, if any:      Protein restriction, if any:      Na restriction, if any:      Fluid restriction:      Additional restrictions:      Lifting restrictions     No dressing needed         Discharge Instructions         BATHING/DRESSING:  ? Ok to shower tomorrow    ACTIVITY LEVEL: If you have received sedation or an anesthetic, you may feel sleepy for several hours. Rest until you are more awake. Gradually resume your normal activities.    No heavy lifting.      DIET: You may resume your home diet. If nausea is present, increase your diet gradually with fluids and bland foods.  Medications: Pain medication should be taken only if needed and as directed. If antibiotics are prescribed, the medication should be taken until completed. You will be given an updated list of you medications.  ? No driving, alcoholic beverages or signing legal documents for  next 24 hours or while taking pain medication    CALL THE DOCTOR:    Some blood in your urine is normal.     Redness, swelling, foul smell around your meatus or fever over 101.   Shortness of breath, Coughing Up Bloody Sputum, or Pains or Swelling in your Calves..   Persistent pain or nausea not relieved by medication.   Problems urinating - unable to urinate or heavy bleeding (with our without clots) in urine.    If any unusual problems or difficulties occur contact your doctor. If you cannot contact your doctor but feel your signs and symptoms warrant a physicians attention return to the emergency room.    Fall Prevention  Millions of people fall every year and injure themselves. You may have had anesthesia or sedation which may increase your risk of falling. You may have health issues that put you at an increased risk of falling.     Here are ways to reduce your risk of falling.  ·   · Make your home safe by keeping walkways clear of objects you may trip over.  · Use non-slip pads under rugs. Do not use area rugs or small throw rugs.  · Use non-slip mats in bathtubs and showers.  · Install handrails and lights on staircases.  · Do not walk in poorly lit areas.  · Do not stand on chairs or wobbly ladders.  · Use caution when reaching overhead or looking upward. This position can cause a loss of balance.  · Be sure your shoes fit properly, have non-slip bottoms and are in good condition.   · Wear shoes both inside and out. Avoid going barefoot or wearing slippers.  · Be cautious when going up and down stairs, curbs, and when walking on uneven sidewalks.  · If your balance is poor, consider using a cane or walker.  · If your fall was related to alcohol use, stop or limit alcohol intake.   · If your fall was related to use of sleeping medicines, talk to your doctor about this. You may need to reduce your dosage at bedtime if you awaken during the night to go to the bathroom.    · To reduce the need for  "nighttime bathroom trips:  ¨ Avoid drinking fluids for several hours before going to bed  ¨ Empty your bladder before going to bed  ¨ Men can keep a urinal at the bedside  · Stay as active as you can. Balance, flexibility, strength, and endurance all come from exercise. They all play a role in preventing falls. Ask your healthcare provider which types of activity are right for you.  · Get your vision checked on a regular basis.  · If you have pets, know where they are before you stand up or walk so you don't trip over them.  · Use night lights.    Discharge References/Attachments     TRANSURETHRAL RESECTION OF THE PROSTATE (TURP): HOME RECOVERY (ENGLISH)    TURP (TRANSURETHRAL RESECTION OF THE PROSTATE) (ENGLISH)    CIPROFLOXACIN TABLETS (ENGLISH)    ACETAMINOPHEN; OXYCODONE TABLETS (ENGLISH)    DOCUSATE CAPSULES (ENGLISH)        Primary Diagnosis     Your primary diagnosis was:  Retention Of Urine      Admission Information     Date & Time Provider Department CSN    4/21/2017 10:20 AM Lenore Ramirez MD [facility] [id number]      Care Providers     Provider Role Specialty Primary office phone    Lenore Ramirez MD Attending Provider Urology [phone]    Lenore Ramirez MD Surgeon  Urology [phone]      Your Vitals Were     BP Pulse Temp Resp Height Weight    138/71 (BP Location: Right arm, Patient Position: Lying, BP Method: Automatic) 60 98.7 °F (37.1 °C) (Oral) 20 6' 1" (1.854 m) 116.7 kg (257 lb 5 oz)    SpO2 BMI             98% 33.95 kg/m2         Recent Lab Values     No lab values to display.      Pending Labs     Order Current Status    Specimen to Pathology - Surgery In process      Allergies as of 4/21/2017        Reactions    Aspirin Other (See Comments)    Nose bleeds     Cholesterol Analogues Other (See Comments)    "stabbing pains in muscles"    Codeine Hallucinations    Metformin Other (See Comments)    "stabbing pain in muscles"    Penicillins       Advance " Directives     An advance directive is a document which, in the event you are no longer able to make decisions for yourself, tells your healthcare team what kind of treatment you do or do not want to receive, or who you would like to make those decisions for you.  If you do not currently have an advance directive, [facility] encourages you to create one.  For more information call:  [phone]), [phone] ([phone]),  or log on to [email or web address]        Language Assistance Services     ATTENTION: Language assistance services are available, free of charge. Please call [phone].      ATENCIÓN: Si marlene arzate, tiene a araujo disposición servicios gratuitos de asistencia lingüística. Llame al [phone].     CHÚ Ý: N?u b?n nói Ti?ng Vi?t, có các d?ch v? h? tr? ngôn ng? mi?n phí dành cho b?n. G?i s? [phone].        [facility] Sign-Up     Activating your [facility] account is as easy as 1-2-3!     1) Visit [email or web address], select Sign Up Now, enter this activation code and your date of birth, then select Next.  [email or web address]  Expires: 4/30/2017 12:07 PM      2) Create a username and password to use when you visit [facility] in the future and select a security question in case you lose your password and select Next.    3) Enter your e-mail address and click Sign Up!    Additional Information  If you have questions, please e-mail [email or web address] or call [phone] to talk to our [facility] staff. Remember, [facility] is NOT to be used for urgent needs. For medical emergencies, dial 911.          [facility] complies with applicable Federal civil rights laws and does not discriminate on the basis of race, color, national origin, age, disability, or sex.

## 2017-04-21 NOTE — BRIEF OP NOTE
[facility]  Brief Operative Note     SUMMARY     Surgery Date: 4/21/2017     Surgeon(s) and Role:     * Lenore Ramirez MD - Primary    Assisting Surgeon: None    Pre-op Diagnosis:  Urinary retention [R33.9]    Post-op Diagnosis:  Post-Op Diagnosis Codes:     * Urinary retention [R33.9]    Procedure(s) (LRB):  TURP WITH BIPOLAR (N/A)    Anesthesia: Choice    Description of the findings of the procedure: Minimal to moderate BPH. Lateral lobes resected.    Findings/Key Components: 20Fr marie catheter placed (15cc in balloon)    Estimated Blood Loss: 20cc         Specimens:   Specimen (12h ago through future)    Start     Ordered    04/21/17 1327  Specimen to Pathology - Surgery  Once     Comments:  Prostate Chips    04/21/17 1327          Discharge Note    SUMMARY     Admit Date: 4/21/2017    Discharge Date and Time:  04/21/2017 1:47 PM    Hospital Course (synopsis of major diagnoses, care, treatment, and services provided during the course of the hospital stay): Uncomplicated TURP     Final Diagnosis: Post-Op Diagnosis Codes:     * Urinary retention [R33.9]    Disposition: Home or Self Care    Follow Up/Patient Instructions:     Medications:  Reconciled Home Medications:   Current Discharge Medication List      START taking these medications    Details   ciprofloxacin HCl (CIPRO) 500 MG tablet Take 1 tablet (500 mg total) by mouth 2 (two) times daily.  Qty: 4 tablet, Refills: 0      !! docusate sodium (COLACE) 100 MG capsule Take 1 capsule (100 mg total) by mouth 2 (two) times daily.  Qty: 30 capsule, Refills: 0      oxycodone-acetaminophen (PERCOCET) 5-325 mg per tablet Take 1 tablet by mouth every 4 (four) hours as needed for Pain.  Qty: 20 tablet, Refills: 0       !! - Potential duplicate medications found. Please discuss with provider.      CONTINUE these medications which have NOT CHANGED    Details   !! docusate sodium (COLACE) 100 MG capsule TAKE ONE CAPSULE BY MOUTH TWICE A DAY FOR 10  DAYS FOR CONSTIPATION  Refills: 0      NOVOLOG MIX 70-30 FLEXPEN 100 unit/mL (70-30) InPn pen INEJCT 33 UNITS TWICE A DAY  Refills: 2      polyethylene glycol (GLYCOLAX) 17 gram/dose powder MIX 17 GRAM(S) IN JUICE OR WATER DAILY  Refills: 1      ranitidine (ZANTAC) 150 MG capsule Take 150 mg by mouth 2 (two) times daily.      BD INSULIN SYRINGE ULTRA-FINE 1 mL 31 gauge x 5/16 Syrg       finasteride (PROSCAR) 5 mg tablet Take 1 tablet (5 mg total) by mouth once daily.  Qty: 30 tablet, Refills: 11      meloxicam (MOBIC) 15 MG tablet       simethicone (MYLICON) 80 MG chewable tablet Take 80 mg by mouth every 6 (six) hours as needed for Flatulence.      tamsulosin (FLOMAX) 0.4 mg Cp24 Take 1 capsule (0.4 mg total) by mouth once daily.  Qty: 30 capsule, Refills: 11       !! - Potential duplicate medications found. Please discuss with provider.          Discharge Procedure Orders  Diet general     Call MD for:  temperature >100.4     Call MD for:  persistent nausea and vomiting     Call MD for:  severe uncontrolled pain     Call MD for:  difficulty breathing, headache or visual disturbances     No dressing needed     Lifting restrictions       Follow-up Information     Follow up with Lenore Ramirez MD In 1 week.    Specialty:  Urology    Why:  For post-op follow up and voiding trial    Contact information:    [address] [place] [id number]  [phone]            #[phone]

## 2017-04-21 NOTE — INTERVAL H&P NOTE
The patient has been examined and the H&P has been reviewed:    I concur with the findings and no changes have occurred since H&P was written.    Anesthesia/Surgery risks, benefits and alternative options discussed and understood by patient/family.          Active Hospital Problems    Diagnosis  POA    Urinary retention [R33.9]  Yes      Resolved Hospital Problems    Diagnosis Date Resolved POA   No resolved problems to display.

## 2017-04-21 NOTE — ANESTHESIA POSTPROCEDURE EVALUATION
"Anesthesia Post Evaluation    Patient: Camilo Kruger    Procedure(s) Performed: Procedure(s) (LRB):  TURP WITH BIPOLAR (N/A)    Final Anesthesia Type: general  Patient location during evaluation: PACU  Patient participation: Yes- Able to Participate  Level of consciousness: awake  Post-procedure vital signs: reviewed and stable  Pain management: adequate  Airway patency: patent  PONV status at discharge: No PONV  Anesthetic complications: no      Cardiovascular status: stable  Respiratory status: unassisted  Hydration status: euvolemic  Follow-up not needed.        Visit Vitals    BP (!) 150/76    Pulse 74    Temp 36.7 °C (98.1 °F) (Oral)    Resp 16    Ht 6' 1" (1.854 m)    Wt 116.7 kg (257 lb 5 oz)    SpO2 99%    BMI 33.95 kg/m2       Pain/Whitney Score: Pain Assessment Performed: Yes (4/20/2017 11:30 AM)  Presence of Pain: denies (4/21/2017 10:47 AM)      "

## 2017-04-21 NOTE — DISCHARGE INSTRUCTIONS
BATHING/DRESSING:  ? Ok to shower tomorrow    ACTIVITY LEVEL: If you have received sedation or an anesthetic, you may feel sleepy for several hours. Rest until you are more awake. Gradually resume your normal activities.    No heavy lifting.      DIET: You may resume your home diet. If nausea is present, increase your diet gradually with fluids and bland foods.  Medications: Pain medication should be taken only if needed and as directed. If antibiotics are prescribed, the medication should be taken until completed. You will be given an updated list of you medications.  ? No driving, alcoholic beverages or signing legal documents for next 24 hours or while taking pain medication    CALL THE DOCTOR:    Some blood in your urine is normal.     Redness, swelling, foul smell around your meatus or fever over 101.   Shortness of breath, Coughing Up Bloody Sputum, or Pains or Swelling in your Calves..   Persistent pain or nausea not relieved by medication.   Problems urinating - unable to urinate or heavy bleeding (with our without clots) in urine.    If any unusual problems or difficulties occur contact your doctor. If you cannot contact your doctor but feel your signs and symptoms warrant a physicians attention return to the emergency room.    Fall Prevention  Millions of people fall every year and injure themselves. You may have had anesthesia or sedation which may increase your risk of falling. You may have health issues that put you at an increased risk of falling.     Here are ways to reduce your risk of falling.  ·   · Make your home safe by keeping walkways clear of objects you may trip over.  · Use non-slip pads under rugs. Do not use area rugs or small throw rugs.  · Use non-slip mats in bathtubs and showers.  · Install handrails and lights on staircases.  · Do not walk in poorly lit areas.  · Do not stand on chairs or wobbly ladders.  · Use caution when reaching overhead or looking upward. This position  can cause a loss of balance.  · Be sure your shoes fit properly, have non-slip bottoms and are in good condition.   · Wear shoes both inside and out. Avoid going barefoot or wearing slippers.  · Be cautious when going up and down stairs, curbs, and when walking on uneven sidewalks.  · If your balance is poor, consider using a cane or walker.  · If your fall was related to alcohol use, stop or limit alcohol intake.   · If your fall was related to use of sleeping medicines, talk to your doctor about this. You may need to reduce your dosage at bedtime if you awaken during the night to go to the bathroom.    · To reduce the need for nighttime bathroom trips:  ¨ Avoid drinking fluids for several hours before going to bed  ¨ Empty your bladder before going to bed  ¨ Men can keep a urinal at the bedside  · Stay as active as you can. Balance, flexibility, strength, and endurance all come from exercise. They all play a role in preventing falls. Ask your healthcare provider which types of activity are right for you.  · Get your vision checked on a regular basis.  · If you have pets, know where they are before you stand up or walk so you don't trip over them.  · Use night lights.

## 2017-04-21 NOTE — H&P (VIEW-ONLY)
Subjective:       Camilo Kruger is a 67 y.o. male who is an established patient who was self-referred for evaluation of BPH/UR.       He reports dramatic worsening of urinary issues since Saturday. He was seen in Urgent Care and given Bactrim and Cardura 1mg for LUTS. No records available. He reports he is voiding [id number] with very small volume. He is very uncomfortable and distended. He has seen Dr Johnson in the past but was not on BPH medications. He is DM2 and sugars have been high recently since changing to long acting insulin. No A1c on record. Also with constipation, no BM x 4-5 days.    PVR (bladder scan) initial visit - >999cc. Marie catheter was placed.    He underwent void trial last week as RN visit that was successful. Unfortunately, he developed recurrent UR later that day and had marie catheter replaced (>700cc). He remains on Flomax.     Cysto/UDS 4/4/17:  Findings of the Procedure: Mild delayed sensation. No IBC/DO. EDUIN not assessed. Normal capacity bladder. Inability to void with and without P1 in place. Valsalva attempts. Cystoscopy essentially normal except for bilobar hypertrophy with kissing lobes. Uroflow with good flow but incomplete emptying with straining.   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 Bilateral prostatic hypertrophy on cystoscopy. Good flow with suspected Valsalva voiding during uroflow. Recommend TURP though concerns remain for continued UR. May require long term catheterization vs CIC. ?InterStim. Also with back issues - may be related to bladder dysfunction.       The following portions of the patient's history were reviewed and updated as appropriate: allergies, current medications, past family history, past medical history, past social history, past surgical history and problem list.    Review of Systems  Constitutional: no fever or chills  ENT: no nasal congestion or sore throat  Respiratory: no cough or shortness of  breath  Cardiovascular: no chest pain or palpitations  Gastrointestinal: no nausea or vomiting, tolerating diet  Genitourinary: as per HPI  Hematologic/Lymphatic: no easy bruising or lymphadenopathy  Musculoskeletal: no arthralgias or myalgias  Skin: no rashes or lesions  Neurological: no seizures or tremors  Behavioral/Psych: no auditory or visual hallucinations       Objective:    Vitals: /74  Ht 6' (1.829 m)  Wt 118.4 kg (261 lb)  BMI 35.4 kg/m2    Physical Exam   General: well developed, well nourished in no acute distress  Head: normocephalic, atraumatic  Neck: supple, trachea midline, no obvious enlargement of thyroid  HEENT: EOMI, mucus membranes moist, sclera anicteric, no hearing impairment  Lungs: symmetric expansion, non-labored breathing  Cardiovascular: regular rate and rhythm, normal pulses  Abdomen: soft, non tender, non distended, no palpable masses, no hepatosplenomegaly, no hernias, bladder nonpalpable. No CVA tenderness.  Musculoskeletal: no peripheral edema, normal ROM in bilateral upper and lower extremities  Lymphatics: no cervical or inguinal lymphadenopathy  Skin: no rashes or lesions  Neuro: alert and oriented x 3, no gross deficits  Psych: normal judgment and insight, normal mood/affect and non-anxious  Genitourinary:   Penis -  circumcised penis without plaques, lesions, or scarring.  Urethra - orthotopic location without stenosis.  Scrotum  - no lesions or rashes, no hydrocele or hernia.  Testes - descended bilaterally, symmetric without masses, non tender.  Epididymides - no cysts or lesions, no spermatocele, symmetric MARIE IN PLACE   ANNEMARIE: Symmetric 40g prostate without nodularity or tenderness. Normal landmarks. seminal vesicles non palpable, normal sphincter tone without hemorrhoids or rectal masses. Normal appearance of anus and perineum.      Lab Review   Urine analysis today in clinic shows - marie    No results found for: WBC, HGB, HCT, MCV, PLT  No results found for:  CREATININE, BUN  No results found for: PSA  No results found for: PSADIAG    Imaging  NA       Assessment/Plan:      1. Urinary retention    - Very high PVR. Distended/uncomfortable.   - Garcia placed - 1200cc initially   - Continue Flomax   - Start Proscar   - Avoid/treat constipation   - Initially successful VOT but failed later that day. Declines VOT today.   - s/p cysto/UDS on 4/4/17 - atonic bladder, BPH   - Recommend TURP. Understands risk of continued UR. May require long-term catheter, CIC, InterStim in future.   - TURP on 4/21/17       Follow up in  1 week post-op for VOT

## 2017-04-21 NOTE — OP NOTE
DATE OF PROCEDURE:  04/21/2017    SURGEON:  Lenore Ramirez M.D.    PREOPERATIVE DIAGNOSIS:  Urinary retention.    POSTOPERATIVE DIAGNOSIS:  Urinary retention.    PROCEDURES PERFORMED:  Transurethral resection of the prostate with bipolar   loop.    INDICATIONS FOR PROCEDURE:  Mr. Kruger is a 67-year-old gentleman who had   urinary retention with over greater than 1 liter of urine drained.  He underwent   voiding trial that was unsuccessful and as he developed recurrent urinary   retention later in the day.  He underwent cystoscopy, urodynamics, which showed   bilateral hypertrophy of the prostate with a good flow on Uroflow where   incomplete emptying and straining noted.  He had no bladder contraction noted on   urodynamics.  He was recommended to undergo TURP for possible correction of his   urinary retention.  He understands that he may require long-term   catheterization versus CIC versus possible InterStim.  He may have a contractile   bladder and TURP may not relieve his urinary retention.    DESCRIPTION OF PROCEDURE:  Mr. Kruger was brought to the Operating Room and   placed under general LMA anesthesia.  Full timeout procedures were performed   identifying the correct patient and procedure.  Appropriate IV antibiotics with   ciprofloxacin were given prior to commencement of surgery.  The patient was   placed in dorsal lithotomy position and prepped and draped in the usual sterile   fashion.    A 26-Austrian resectoscope was passed per urethra and into the bladder with the   visual obturator.  This was then changed out to the working element with the   bipolar loop cautery.  The ureteral orifices were identified and were marked   with fulguration.  The remainder of the bladder was noted to be normal.    Upon evaluation of the prostatic urethra while under general anesthetic, he was   noted to have only minimal-to-moderate BPH with some lateral hypertrophy.  No   major outlet obstruction noted.  This  may be due to the irrigation and the   resectoscope being in place.  Decision was made to proceed with TURP to reduce   possible bladder outlet obstruction.    The resectoscope was used to first resected the patient's left lateral lobe.    This was done from the bladder neck to the verumontanum.  The prostate was noted   to be short in length.  Bleeding points were addressed with fulguration as they   were encountered.  The prostate was resected until capsular fibers were noted.    Similar procedure was performed on the patient's right side with resection from   the bladder neck to the verumontanum.  There was some anterior prostate that   was removed that was falling into the prostatic urethra.  Care was taken not to   resect distal to the verumontanum.  At the conclusion of the procedure, the   bladder neck was noted to be opened and the prostatic urethra was noted to be   widely patent.  The back uterus was used to remove all fragments from the   bladder.  Hemostasis was achieved prior to the conclusion of the procedure.  The   resectoscope was removed and a 20-Korean Garcia catheter was placed with 15 mL   of water into the balloon.  The catheter was hand irrigated and was noted to be   very light pink in appearance.    The patient was then awakened from general anesthesia and transferred to the   PACU in stable condition.    COMPLICATIONS:  None.    FINDINGS:  Minimal-to-moderate lateral BPH.    DRAINS:  A 20-Korean Garcia catheter.    ESTIMATED BLOOD LOSS:  20 mL.    SPECIMENS:  Prostate chips.    PATIENT DISPOSITION:  The patient is stable and deemed for discharge home.  He   will follow up in approximately 1 week for postoperative check and for voiding   trial.  He understands the risk of persistent urinary retention.      EKP/SN  dd: 04/21/2017 13:52:08 (CDT)  td: 04/21/2017 15:23:06 (CDT)  Doc ID   #[id number]  Job ID #[id number]    CC:

## 2017-04-21 NOTE — ANESTHESIA PREPROCEDURE EVALUATION
04/21/2017  Camilo Kruger is a 67 y.o., male.    Anesthesia Evaluation    I have reviewed the Patient Summary Reports.     I have reviewed the Medications.     Review of Systems  Anesthesia Hx:  No problems with previous Anesthesia    Social:  Non-Smoker, No Alcohol Use    Cardiovascular:   Exercise tolerance: good    Renal/:   BPH    Neurological:   Neuromuscular Disease,    Endocrine:   Diabetes, well controlled        Physical Exam  General:  Well nourished    Airway/Jaw/Neck:  Airway Findings: Mouth Opening: Normal Tongue: Normal  General Airway Assessment: Adult  Mallampati: II  TM Distance: Normal, at least 6 cm  Jaw/Neck Findings:  Neck ROM: Normal ROM      Dental:  Dental Findings: In tact   Chest/Lungs:  Chest/Lungs Findings: Clear to auscultation, Normal Respiratory Rate     Heart/Vascular:  Heart Findings: Rate: Normal        Mental Status:  Mental Status Findings:  Cooperative, Alert and Oriented         Anesthesia Plan  Type of Anesthesia, risks & benefits discussed:  Anesthesia Type:  general  Patient's Preference:   Intra-op Monitoring Plan: standard ASA monitors  Intra-op Monitoring Plan Comments:   Post Op Pain Control Plan:   Post Op Pain Control Plan Comments:   Induction:   IV  Beta Blocker:  Patient is not currently on a Beta-Blocker (No further documentation required).       Informed Consent: Patient understands risks and agrees with Anesthesia plan.  Questions answered. Anesthesia consent signed with patient.  ASA Score: 3     Day of Surgery Review of History & Physical:    H&P update referred to the provider.         Ready For Surgery From Anesthesia Perspective.

## 2017-04-27 ENCOUNTER — OFFICE VISIT (OUTPATIENT)
Dept: UROLOGY | Facility: CLINIC | Age: 67
End: 2017-04-27
Payer: MEDICARE

## 2017-04-27 VITALS
HEART RATE: 68 BPM | WEIGHT: 257.94 LBS | BODY MASS INDEX: 34.19 KG/M2 | SYSTOLIC BLOOD PRESSURE: 118 MMHG | DIASTOLIC BLOOD PRESSURE: 78 MMHG | HEIGHT: 73 IN | RESPIRATION RATE: 16 BRPM

## 2017-04-27 DIAGNOSIS — R33.9 URINARY RETENTION: Primary | ICD-10-CM

## 2017-04-27 PROCEDURE — 99999 PR PBB SHADOW E&M-EST. PATIENT-LVL III: CPT | Mod: PBBFAC,,, | Performed by: UROLOGY

## 2017-04-27 PROCEDURE — 99024 POSTOP FOLLOW-UP VISIT: CPT | Mod: S$GLB,,, | Performed by: UROLOGY

## 2017-04-27 RX ORDER — INSULIN ASPART 100 [IU]/ML
INJECTION, SUSPENSION SUBCUTANEOUS
Refills: 3 | COMMUNITY
Start: 2017-04-03

## 2017-04-27 NOTE — MR AVS SNAPSHOT
[facility]  [address] [facility] [address]  [place] [address]  Phone: [phone]                  Camilo Kruger   2017 9:00 AM   Office Visit    Description:  Male : 1950   Provider:  Lenore Ramirez MD   Department:  [facility]           Reason for Visit     Post-op Evaluation           Diagnoses this Visit        Comments    Urinary retention    -  Primary            To Do List           Goals (5 Years of Data)     None      Follow-Up and Disposition     Return in about 1 week (around 2017) for Post-op, PVR check.      [facility]     [facility] Nurse Care Line -  Assistance  Unless otherwise directed by your provider, please contact [facility], our nurse care line that is available for  assistance.     Registered nurses in the [facility] provide: appointment scheduling, clinical advisement, health education, and other advisory services.  Call: [phone] (toll free)               Medications           Message regarding Medications     Verify the changes and/or additions to your medication regime listed below are the same as discussed with your clinician today.  If any of these changes or additions are incorrect, please notify your healthcare provider.        STOP taking these medications     NOVOLOG MIX 70-30 FLEXPEN 100 unit/mL (70-30) InPn pen INEJCT 33 UNITS TWICE A DAY           Verify that the below list of medications is an accurate representation of the medications you are currently taking.  If none reported, the list may be blank. If incorrect, please contact your healthcare provider. Carry this list with you in case of emergency.           Current Medications     BD INSULIN SYRINGE ULTRA-FINE 1 mL 31 gauge x 5/16 Syrg     docusate sodium (COLACE) 100 MG capsule TAKE ONE CAPSULE BY MOUTH TWICE A DAY FOR 10 DAYS FOR CONSTIPATION    docusate sodium (COLACE) 100 MG capsule Take 1 capsule (100 mg total) by mouth 2  "(two) times daily.    finasteride (PROSCAR) 5 mg tablet Take 1 tablet (5 mg total) by mouth once daily.    meloxicam (MOBIC) 15 MG tablet     NOVOLOG MIX 70-30 100 unit/mL (70-30) Soln INJECT 33 UNITS TWICE DAILY SUBCUTANEOUS    oxycodone-acetaminophen (PERCOCET) 5-325 mg per tablet Take 1 tablet by mouth every 4 (four) hours as needed for Pain.    polyethylene glycol (GLYCOLAX) 17 gram/dose powder MIX 17 GRAM(S) IN JUICE OR WATER DAILY    ranitidine (ZANTAC) 150 MG capsule Take 150 mg by mouth 2 (two) times daily.    simethicone (MYLICON) 80 MG chewable tablet Take 80 mg by mouth every 6 (six) hours as needed for Flatulence.    tamsulosin (FLOMAX) 0.4 mg Cp24 Take 1 capsule (0.4 mg total) by mouth once daily.           Clinical Reference Information           Your Vitals Were     BP Pulse Resp Height Weight BMI    118/78 68 16 6' 1" (1.854 m) 117 kg (257 lb 15 oz) 34.03 kg/m2      Blood Pressure          Most Recent Value    BP  118/78      Allergies as of 4/27/2017     Aspirin    Cholesterol Analogues    Codeine    Metformin    Penicillins      Immunizations Administered on Date of Encounter - 4/27/2017     None      Orders Placed During Today's Visit      Normal Orders This Visit    Voiding Trial       [facility] Sign-Up     Activating your [facility] account is as easy as 1-2-3!     1) Visit [email or web address], select Sign Up Now, enter this activation code and your date of birth, then select Next.  [email or web address]  Expires: 4/30/2017 12:07 PM      2) Create a username and password to use when you visit [facility] in the future and select a security question in case you lose your password and select Next.    3) Enter your e-mail address and click Sign Up!    Additional Information  If you have questions, please e-mail [email or web address] or call [phone] to talk to our [facility] staff. Remember, [facility] is NOT to be used for urgent needs. For medical emergencies, dial 911.         Language Assistance Services "     ATTENTION: Language assistance services are available, free of charge. Please call [phone].      ATENCIÓN: Si habla rosalieañol, tiene a araujo disposición servicios gratuitos de asistencia lingüística. Llame al [phone].     CHÚ Ý: N?u b?n nói Ti?ng Vi?t, có các d?ch v? h? tr? ngôn ng? mi?n phí dành cho b?n. G?i s? [phone].         [facility] complies with applicable Federal civil rights laws and does not discriminate on the basis of race, color, national origin, age, disability, or sex.

## 2017-04-27 NOTE — PROGRESS NOTES
Subjective:       Camilo Kruger is a 67 y.o. male who is an established patient who was self-referred for evaluation of BPH/UR.       He reports dramatic worsening of urinary issues since Saturday. He was seen in Urgent Care and given Bactrim and Cardura 1mg for LUTS. No records available. He reports he is voiding [id number] with very small volume. He is very uncomfortable and distended. He has seen Dr Johnson in the past but was not on BPH medications. He is DM2 and sugars have been high recently since changing to long acting insulin. No A1c on record. Also with constipation, no BM x 4-5 days.    PVR (bladder scan) initial visit - >999cc. Marie catheter was placed.    He underwent void trial last week as RN visit that was successful. Unfortunately, he developed recurrent UR later that day and had marie catheter replaced (>700cc). He remains on Flomax.     Cysto/UDS 4/4/17:  Findings of the Procedure: Mild delayed sensation. No IBC/DO. EDUIN not assessed. Normal capacity bladder. Inability to void with and without P1 in place. Valsalva attempts. Cystoscopy essentially normal except for bilobar hypertrophy with kissing lobes. Uroflow with good flow but incomplete emptying with straining.   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 Bilateral prostatic hypertrophy on cystoscopy. Good flow with suspected Valsalva voiding during uroflow. Recommend TURP though concerns remain for continued UR. May require long term catheterization vs CIC. ?InterStim. Also with back issues - may be related to bladder dysfunction.     He is s/p TURP 4/21/17. BPH was not noted to be too significant.     Void trial:  240cc instilled, 250cc voided.    The following portions of the patient's history were reviewed and updated as appropriate: allergies, current medications, past family history, past medical history, past social history, past surgical history and problem list.    Review of Systems  Constitutional: no  "fever or chills  ENT: no nasal congestion or sore throat  Respiratory: no cough or shortness of breath  Cardiovascular: no chest pain or palpitations  Gastrointestinal: no nausea or vomiting, tolerating diet  Genitourinary: as per HPI  Hematologic/Lymphatic: no easy bruising or lymphadenopathy  Musculoskeletal: no arthralgias or myalgias  Skin: no rashes or lesions  Neurological: no seizures or tremors  Behavioral/Psych: no auditory or visual hallucinations       Objective:    Vitals: Resp 16  Ht 6' 1" (1.854 m)  Wt 117 kg (257 lb 15 oz)  BMI 34.03 kg/m2    Physical Exam   General: well developed, well nourished in no acute distress  Head: normocephalic, atraumatic  Neck: supple, trachea midline, no obvious enlargement of thyroid  HEENT: EOMI, mucus membranes moist, sclera anicteric, no hearing impairment  Lungs: symmetric expansion, non-labored breathing  Cardiovascular: regular rate and rhythm, normal pulses  Abdomen: soft, non tender, non distended, no palpable masses, no hepatosplenomegaly, no hernias, bladder nonpalpable. No CVA tenderness.  Musculoskeletal: no peripheral edema, normal ROM in bilateral upper and lower extremities  Lymphatics: no cervical or inguinal lymphadenopathy  Skin: no rashes or lesions  Neuro: alert and oriented x 3, no gross deficits  Psych: normal judgment and insight, normal mood/affect and non-anxious  Genitourinary:   Penis -  circumcised penis without plaques, lesions, or scarring.  Urethra - orthotopic location without stenosis.  Scrotum  - no lesions or rashes, no hydrocele or hernia.  Testes - descended bilaterally, symmetric without masses, non tender.  Epididymides - no cysts or lesions, no spermatocele, symmetric KNOX IN PLACE   ANNEMARIE: Symmetric 40g prostate without nodularity or tenderness. Normal landmarks. seminal vesicles non palpable, normal sphincter tone without hemorrhoids or rectal masses. Normal appearance of anus and perineum.      Lab Review   Urine analysis " today in clinic shows - marie    Lab Results   Component Value Date    WBC 5.85 04/20/2017    HGB 14.2 04/20/2017    HCT 42.8 04/20/2017    MCV 85 04/20/2017     04/20/2017     Lab Results   Component Value Date    CREATININE 0.9 04/20/2017    BUN 17 04/20/2017     No results found for: PSA  No results found for: PSADIAG    Imaging  NA       Assessment/Plan:      1. Urinary retention    - Very high PVR. Distended/uncomfortable.   - Marie placed - 1200cc initially   - Continue Flomax   - Start Proscar   - Avoid/treat constipation   - Initially successful VOT but failed later that day. Declines VOT today.   - s/p cysto/UDS on 4/4/17 - atonic bladder, BPH   - Recommend TURP. Understands risk of continued UR. May require long-term catheter, CIC, InterStim in future.   - s/p TURP on 4/21/17 - successful voiding trial   - CIC teaching today for poss UR episode       Follow up in  1 week for PVR

## 2017-05-04 ENCOUNTER — OFFICE VISIT (OUTPATIENT)
Dept: UROLOGY | Facility: CLINIC | Age: 67
End: 2017-05-04
Payer: MEDICARE

## 2017-05-04 VITALS
HEIGHT: 73 IN | BODY MASS INDEX: 34.71 KG/M2 | HEART RATE: 68 BPM | DIASTOLIC BLOOD PRESSURE: 72 MMHG | SYSTOLIC BLOOD PRESSURE: 118 MMHG | WEIGHT: 261.94 LBS | RESPIRATION RATE: 16 BRPM

## 2017-05-04 DIAGNOSIS — R30.0 DYSURIA: ICD-10-CM

## 2017-05-04 DIAGNOSIS — R33.9 URINARY RETENTION: Primary | ICD-10-CM

## 2017-05-04 LAB
BILIRUB SERPL-MCNC: ABNORMAL MG/DL
BLOOD URINE, POC: 250
COLOR, POC UA: ABNORMAL
GLUCOSE UR QL STRIP: 250
KETONES UR QL STRIP: ABNORMAL
LEUKOCYTE ESTERASE URINE, POC: ABNORMAL
NITRITE, POC UA: POSITIVE
PH, POC UA: 7
PROTEIN, POC: ABNORMAL
SPECIFIC GRAVITY, POC UA: 1010
UROBILINOGEN, POC UA: NORMAL

## 2017-05-04 PROCEDURE — 87088 URINE BACTERIA CULTURE: CPT

## 2017-05-04 PROCEDURE — 99024 POSTOP FOLLOW-UP VISIT: CPT | Mod: S$GLB,,, | Performed by: UROLOGY

## 2017-05-04 PROCEDURE — 87186 SC STD MICRODIL/AGAR DIL: CPT

## 2017-05-04 PROCEDURE — 81002 URINALYSIS NONAUTO W/O SCOPE: CPT | Mod: S$GLB,,, | Performed by: UROLOGY

## 2017-05-04 PROCEDURE — 87077 CULTURE AEROBIC IDENTIFY: CPT

## 2017-05-04 PROCEDURE — 99999 PR PBB SHADOW E&M-EST. PATIENT-LVL III: CPT | Mod: PBBFAC,,, | Performed by: UROLOGY

## 2017-05-04 PROCEDURE — 87086 URINE CULTURE/COLONY COUNT: CPT

## 2017-05-04 NOTE — MR AVS SNAPSHOT
[facility]  [address]  [place] [address]  Phone: [phone]                  Camilo Kruger   2017 11:00 AM   Office Visit    Description:  Male : 1950   Provider:  Lenore Ramirez MD   Department:  [facility]           Reason for Visit     Dysuria     Hematuria           Diagnoses this Visit        Comments    Urinary retention    -  Primary     Dysuria                To Do List           Goals (5 Years of Data)     None      Follow-Up and Disposition     Return in about 4 weeks (around 2017) for PVR check.      [facility]     [facility] Nurse Care Line -  Assistance  Unless otherwise directed by your provider, please contact [facility], our nurse care line that is available for  assistance.     Registered nurses in the [facility] provide: appointment scheduling, clinical advisement, health education, and other advisory services.  Call: [phone] (toll free)               Medications           Message regarding Medications     Verify the changes and/or additions to your medication regime listed below are the same as discussed with your clinician today.  If any of these changes or additions are incorrect, please notify your healthcare provider.             Verify that the below list of medications is an accurate representation of the medications you are currently taking.  If none reported, the list may be blank. If incorrect, please contact your healthcare provider. Carry this list with you in case of emergency.           Current Medications     BD INSULIN SYRINGE ULTRA-FINE 1 mL 31 gauge x 5/16 Syrg     docusate sodium (COLACE) 100 MG capsule TAKE ONE CAPSULE BY MOUTH TWICE A DAY FOR 10 DAYS FOR CONSTIPATION    docusate sodium (COLACE) 100 MG capsule Take 1 capsule (100 mg total) by mouth 2 (two) times daily.    meloxicam (MOBIC) 15 MG tablet     NOVOLOG MIX 70-30 100 unit/mL (70-30) Soln INJECT 33  "UNITS TWICE DAILY SUBCUTANEOUS    oxycodone-acetaminophen (PERCOCET) 5-325 mg per tablet Take 1 tablet by mouth every 4 (four) hours as needed for Pain.    polyethylene glycol (GLYCOLAX) 17 gram/dose powder MIX 17 GRAM(S) IN JUICE OR WATER DAILY    ranitidine (ZANTAC) 150 MG capsule Take 150 mg by mouth 2 (two) times daily.    simethicone (MYLICON) 80 MG chewable tablet Take 80 mg by mouth every 6 (six) hours as needed for Flatulence.    finasteride (PROSCAR) 5 mg tablet Take 1 tablet (5 mg total) by mouth once daily.    tamsulosin (FLOMAX) 0.4 mg Cp24 Take 1 capsule (0.4 mg total) by mouth once daily.           Clinical Reference Information           Your Vitals Were     BP Pulse Resp Height Weight BMI    118/72 68 16 6' 1" (1.854 m) 118.8 kg (261 lb 14.5 oz) 34.55 kg/m2      Blood Pressure          Most Recent Value    BP  118/72      Allergies as of 5/4/2017     Aspirin    Cholesterol Analogues    Codeine    Metformin    Penicillins      Immunizations Administered on Date of Encounter - 5/4/2017     None      Orders Placed During Today's Visit      Normal Orders This Visit    POCT URINE DIPSTICK WITHOUT MICROSCOPE     Urine culture     Future Labs/Procedures Expected by Expires    Bladder scan  As directed 5/8/2017      [facility] Sign-Up     Activating your [facility] account is as easy as 1-2-3!     1) Visit [email or web address], select Sign Up Now, enter this activation code and your date of birth, then select Next.  [email or web address]  Expires: 6/18/2017 11:14 AM      2) Create a username and password to use when you visit [facility] in the future and select a security question in case you lose your password and select Next.    3) Enter your e-mail address and click Sign Up!    Additional Information  If you have questions, please e-mail [email or web address] or call [phone] to talk to our [facility] staff. Remember, [facility] is NOT to be used for urgent needs. For medical emergencies, dial 911.         Language " Assistance Services     ATTENTION: Language assistance services are available, free of charge. Please call [phone].      ATENCIÓN: Si habla huey, tiene a araujo disposición servicios gratuitos de asistencia lingüística. Llame al [phone].     CHÚ Ý: N?u b?n nói Ti?ng Vi?t, có các d?ch v? h? tr? ngôn ng? mi?n phí dành cho b?n. G?i s? [phone].         [facility] complies with applicable Federal civil rights laws and does not discriminate on the basis of race, color, national origin, age, disability, or sex.

## 2017-05-04 NOTE — PROGRESS NOTES
Subjective:       Camilo Kruger is a 67 y.o. male who is an established patient who was self-referred for evaluation of BPH/UR.       He reports dramatic worsening of urinary issues since Saturday. He was seen in Urgent Care and given Bactrim and Cardura 1mg for LUTS. No records available. He reports he is voiding [id number] with very small volume. He is very uncomfortable and distended. He has seen Dr Johnson in the past but was not on BPH medications. He is DM2 and sugars have been high recently since changing to long acting insulin. No A1c on record. Also with constipation, no BM x 4-5 days.    PVR (bladder scan) initial visit - >999cc. Marie catheter was placed.    He underwent void trial last week as RN visit that was successful. Unfortunately, he developed recurrent UR later that day and had marie catheter replaced (>700cc). He remains on Flomax.     Cysto/UDS 4/4/17:  Findings of the Procedure: Mild delayed sensation. No IBC/DO. EDUIN not assessed. Normal capacity bladder. Inability to void with and without P1 in place. Valsalva attempts. Cystoscopy essentially normal except for bilobar hypertrophy with kissing lobes. Uroflow with good flow but incomplete emptying with straining.   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 Bilateral prostatic hypertrophy on cystoscopy. Good flow with suspected Valsalva voiding during uroflow. Recommend TURP though concerns remain for continued UR. May require long term catheterization vs CIC. ?InterStim. Also with back issues - may be related to bladder dysfunction.     He is s/p TURP 4/21/17. BPH was not noted to be too significant. He had successful VOT post-op. He was taught CIC as backup. He has had some dysuria and hematuria.     PVR (bladder scan) today - 79cc      The following portions of the patient's history were reviewed and updated as appropriate: allergies, current medications, past family history, past medical history, past social  "history, past surgical history and problem list.    Review of Systems  Constitutional: no fever or chills  ENT: no nasal congestion or sore throat  Respiratory: no cough or shortness of breath  Cardiovascular: no chest pain or palpitations  Gastrointestinal: no nausea or vomiting, tolerating diet  Genitourinary: as per HPI  Hematologic/Lymphatic: no easy bruising or lymphadenopathy  Musculoskeletal: no arthralgias or myalgias  Skin: no rashes or lesions  Neurological: no seizures or tremors  Behavioral/Psych: no auditory or visual hallucinations       Objective:    Vitals: /72  Pulse 68  Resp 16  Ht 6' 1" (1.854 m)  Wt 118.8 kg (261 lb 14.5 oz)  BMI 34.55 kg/m2    Physical Exam   General: well developed, well nourished in no acute distress  Head: normocephalic, atraumatic  Neck: supple, trachea midline, no obvious enlargement of thyroid  HEENT: EOMI, mucus membranes moist, sclera anicteric, no hearing impairment  Lungs: symmetric expansion, non-labored breathing  Cardiovascular: regular rate and rhythm, normal pulses  Abdomen: soft, non tender, non distended, no palpable masses, no hepatosplenomegaly, no hernias, bladder nonpalpable. No CVA tenderness.  Musculoskeletal: no peripheral edema, normal ROM in bilateral upper and lower extremities  Lymphatics: no cervical or inguinal lymphadenopathy  Skin: no rashes or lesions  Neuro: alert and oriented x 3, no gross deficits  Psych: normal judgment and insight, normal mood/affect and non-anxious  Genitourinary: (last visit)  Penis -  circumcised penis without plaques, lesions, or scarring.  Urethra - orthotopic location without stenosis.  Scrotum  - no lesions or rashes, no hydrocele or hernia.  Testes - descended bilaterally, symmetric without masses, non tender.  Epididymides - no cysts or lesions, no spermatocele, symmetric   ANNEMARIE: Symmetric 40g prostate without nodularity or tenderness. Normal landmarks. seminal vesicles non palpable, normal sphincter tone " without hemorrhoids or rectal masses. Normal appearance of anus and perineum.      Lab Review   Urine analysis today in clinic shows - ++LE, +nitrites, 250 RBCs    Lab Results   Component Value Date    WBC 5.85 04/20/2017    HGB 14.2 04/20/2017    HCT 42.8 04/20/2017    MCV 85 04/20/2017     04/20/2017     Lab Results   Component Value Date    CREATININE 0.9 04/20/2017    BUN 17 04/20/2017     No results found for: PSA  No results found for: PSADIAG    Imaging  NA       Assessment/Plan:      1. Urinary retention    - Very high PVR initially. Distended/uncomfortable.   - Garcia placed - 1200cc initially   - Continue Flomax, Proscar   - Avoid/treat constipation   - Initially successful VOT but failed later that day.    - s/p cysto/UDS on 4/4/17 - atonic bladder, BPH   - May require long-term catheter, CIC, InterStim in future.   - s/p TURP on 4/21/17 - successful voiding trial   - CIC teaching today for poss UR episode   - PVR acceptable     2. Dysuria   - Possible UTI   - Will send UCx   - Declined Pyridium      Follow up in   4 weeks for PVR

## 2017-05-06 LAB — BACTERIA UR CULT: NORMAL

## 2017-05-08 ENCOUNTER — TELEPHONE (OUTPATIENT)
Dept: UROLOGY | Facility: CLINIC | Age: 67
End: 2017-05-08

## 2017-05-08 RX ORDER — CIPROFLOXACIN 500 MG/1
500 TABLET ORAL 2 TIMES DAILY
Qty: 14 TABLET | Refills: 0 | Status: SHIPPED | OUTPATIENT
Start: 2017-05-08 | End: 2017-05-30

## 2017-05-08 NOTE — TELEPHONE ENCOUNTER
Patient was informed of UCx results and reported that he had a significant amount of blood in his urine Sunday x2. He admits to doing more around the house than before and it did let up- patient wants to know if this is normal or should he be concerned. Please advise

## 2017-05-08 NOTE — TELEPHONE ENCOUNTER
Please inform patient of urinary tract infection on recent urine culture. Appropriate antibiotics (Cipro) were sent in to the pharmacy.

## 2017-05-08 NOTE — TELEPHONE ENCOUNTER
----- Message from Karen Woodruff sent at 5/8/2017  9:10 AM CDT -----  Contact: self  Pt stated that at 2 pm yesterday he a a huge amount of blood flow. He also called concerning US results. [phone]

## 2017-05-08 NOTE — TELEPHONE ENCOUNTER
Encourage him to drink plenty of fluids to keep bladder flushed. As long as he is urinating, should be fine. Blood is expected post-op. Limit strenuous activities. Take abx given as well.     Call with further concerns.

## 2017-05-16 ENCOUNTER — OFFICE VISIT (OUTPATIENT)
Dept: UROLOGY | Facility: CLINIC | Age: 67
End: 2017-05-16
Payer: MEDICARE

## 2017-05-16 ENCOUNTER — TELEPHONE (OUTPATIENT)
Dept: UROLOGY | Facility: CLINIC | Age: 67
End: 2017-05-16

## 2017-05-16 VITALS
HEIGHT: 73 IN | WEIGHT: 259.94 LBS | SYSTOLIC BLOOD PRESSURE: 124 MMHG | DIASTOLIC BLOOD PRESSURE: 72 MMHG | BODY MASS INDEX: 34.45 KG/M2

## 2017-05-16 DIAGNOSIS — R31.29 MICROHEMATURIA: ICD-10-CM

## 2017-05-16 DIAGNOSIS — R30.0 DYSURIA: Primary | ICD-10-CM

## 2017-05-16 LAB
BILIRUB SERPL-MCNC: ABNORMAL MG/DL
BLOOD URINE, POC: 250
COLOR, POC UA: YELLOW
GLUCOSE UR QL STRIP: 500
KETONES UR QL STRIP: ABNORMAL
LEUKOCYTE ESTERASE URINE, POC: ABNORMAL
NITRITE, POC UA: ABNORMAL
PH, POC UA: 6
PROTEIN, POC: 100
SPECIFIC GRAVITY, POC UA: 1000
UROBILINOGEN, POC UA: ABNORMAL

## 2017-05-16 PROCEDURE — 87088 URINE BACTERIA CULTURE: CPT

## 2017-05-16 PROCEDURE — 99999 PR PBB SHADOW E&M-EST. PATIENT-LVL III: CPT | Mod: PBBFAC,,, | Performed by: NURSE PRACTITIONER

## 2017-05-16 PROCEDURE — 87086 URINE CULTURE/COLONY COUNT: CPT

## 2017-05-16 PROCEDURE — 81001 URINALYSIS AUTO W/SCOPE: CPT | Mod: S$GLB,,, | Performed by: NURSE PRACTITIONER

## 2017-05-16 PROCEDURE — 1160F RVW MEDS BY RX/DR IN RCRD: CPT | Mod: S$GLB,,, | Performed by: NURSE PRACTITIONER

## 2017-05-16 PROCEDURE — 87186 SC STD MICRODIL/AGAR DIL: CPT

## 2017-05-16 PROCEDURE — 87077 CULTURE AEROBIC IDENTIFY: CPT

## 2017-05-16 PROCEDURE — 1159F MED LIST DOCD IN RCRD: CPT | Mod: S$GLB,,, | Performed by: NURSE PRACTITIONER

## 2017-05-16 PROCEDURE — 99024 POSTOP FOLLOW-UP VISIT: CPT | Mod: S$GLB,,, | Performed by: NURSE PRACTITIONER

## 2017-05-16 RX ORDER — TAMSULOSIN HYDROCHLORIDE 0.4 MG/1
CAPSULE ORAL
Refills: 11 | COMMUNITY
Start: 2017-05-08 | End: 2017-06-08

## 2017-05-16 RX ORDER — PHENAZOPYRIDINE HYDROCHLORIDE 200 MG/1
200 TABLET, FILM COATED ORAL 3 TIMES DAILY PRN
Qty: 21 TABLET | Refills: 3 | Status: SHIPPED | OUTPATIENT
Start: 2017-05-16 | End: 2017-05-26

## 2017-05-16 NOTE — MR AVS SNAPSHOT
[facility]  [address] [facility] [address]  [place] [address]  Phone: [phone]                  Camilo Villegasulisses   2017 2:00 PM   Office Visit    Description:  Male : 1950   Provider:  Meg Barrett NP   Department:  [facility]           Reason for Visit     Follow-up           Diagnoses this Visit        Comments    Dysuria    -  Primary            To Do List           Future Appointments        Provider Department Dept Phone    2017 9:40 AM Lenore Ramirez MD [facility] [phone]      Goals (5 Years of Data)     None      Follow-Up and Disposition     Return if symptoms worsen or fail to improve.       These Medications        Disp Refills Start End    phenazopyridine (PYRIDIUM) 200 MG tablet 21 tablet 3 2017    Take 1 tablet (200 mg total) by mouth 3 (three) times daily as needed for Pain. - Oral    Pharmacy: [facility]/pharmacy #[id number] - [place] - [address] Ph #: [phone]         [facility] On [facility]     [facility] Nurse Care Line -  Assistance  Unless otherwise directed by your provider, please contact [facility], our nurse care line that is available for  assistance.     Registered nurses in the [facility] provide: appointment scheduling, clinical advisement, health education, and other advisory services.  Call: [phone] (toll free)               Medications           Message regarding Medications     Verify the changes and/or additions to your medication regime listed below are the same as discussed with your clinician today.  If any of these changes or additions are incorrect, please notify your healthcare provider.        START taking these NEW medications        Refills    phenazopyridine (PYRIDIUM) 200 MG tablet 3    Sig: Take 1 tablet (200 mg total) by mouth 3 (three) times daily as needed for Pain.    Class: Normal    Route: Oral           Verify that the below list of  "medications is an accurate representation of the medications you are currently taking.  If none reported, the list may be blank. If incorrect, please contact your healthcare provider. Carry this list with you in case of emergency.           Current Medications     BD INSULIN SYRINGE ULTRA-FINE 1 mL 31 gauge x 5/16 Syrg     ciprofloxacin HCl (CIPRO) 500 MG tablet Take 1 tablet (500 mg total) by mouth 2 (two) times daily.    docusate sodium (COLACE) 100 MG capsule TAKE ONE CAPSULE BY MOUTH TWICE A DAY FOR 10 DAYS FOR CONSTIPATION    docusate sodium (COLACE) 100 MG capsule Take 1 capsule (100 mg total) by mouth 2 (two) times daily.    finasteride (PROSCAR) 5 mg tablet Take 1 tablet (5 mg total) by mouth once daily.    meloxicam (MOBIC) 15 MG tablet     NOVOLOG MIX 70-30 100 unit/mL (70-30) Soln INJECT 33 UNITS TWICE DAILY SUBCUTANEOUS    oxycodone-acetaminophen (PERCOCET) 5-325 mg per tablet Take 1 tablet by mouth every 4 (four) hours as needed for Pain.    phenazopyridine (PYRIDIUM) 200 MG tablet Take 1 tablet (200 mg total) by mouth 3 (three) times daily as needed for Pain.    polyethylene glycol (GLYCOLAX) 17 gram/dose powder MIX 17 GRAM(S) IN JUICE OR WATER DAILY    ranitidine (ZANTAC) 150 MG capsule Take 150 mg by mouth 2 (two) times daily.    simethicone (MYLICON) 80 MG chewable tablet Take 80 mg by mouth every 6 (six) hours as needed for Flatulence.    tamsulosin (FLOMAX) 0.4 mg Cp24 TAKE 1 CAPSULE (0.4 MG TOTAL) BY MOUTH ONCE DAILY.           Clinical Reference Information           Your Vitals Were     BP Height Weight BMI       124/72 6' 1" (1.854 m) 117.9 kg (259 lb 14.8 oz) 34.29 kg/m2       Blood Pressure          Most Recent Value    BP  124/72      Allergies as of 5/16/2017     Aspirin    Cholesterol Analogues    Codeine    Metformin    Penicillins      Immunizations Administered on Date of Encounter - 5/16/2017     None      Orders Placed During Today's Visit      Normal Orders This Visit    POCT " Bladder Scan     POCT urinalysis, dipstick or tablet reag     Urine culture       [facility] Sign-Up     Activating your [facility] account is as easy as 1-2-3!     1) Visit [email or web address], select Sign Up Now, enter this activation code and your date of birth, then select Next.  [email or web address]  Expires: 6/18/2017 11:14 AM      2) Create a username and password to use when you visit [facility] in the future and select a security question in case you lose your password and select Next.    3) Enter your e-mail address and click Sign Up!    Additional Information  If you have questions, please e-mail [email or web address] or call [phone] to talk to our [facility] staff. Remember, [facility] is NOT to be used for urgent needs. For medical emergencies, dial 911.         Language Assistance Services     ATTENTION: Language assistance services are available, free of charge. Please call [phone].      ATENCIÓN: Si habla español, tiene a araujo disposición servicios gratuitos de asistencia lingüística. Llame al [phone].     CHÚ Ý: N?u b?n nói Ti?ng Vi?t, có các d?ch v? h? tr? ngôn ng? mi?n phí dành cho b?n. G?i s? [phone].         [facility] complies with applicable Federal civil rights laws and does not discriminate on the basis of race, color, national origin, age, disability, or sex.

## 2017-05-16 NOTE — TELEPHONE ENCOUNTER
----- Message from Amanda Duke sent at 5/16/2017  9:45 AM CDT -----  Contact: self  Patient has completed the course of antibiotics . He is still having burning . He is requesting to drop off a urine specimen  .               [phone]           LL

## 2017-05-16 NOTE — PROGRESS NOTES
"Subjective:       Patient ID: Camilo Kruger is a 67 y.o. male who is an established patient of Dr. Ramirez although new to me. He was last seen in this office 5/4/2017    Chief Complaint:   Chief Complaint   Patient presents with    Follow-up     patient c/o of burning       Dysuria  Patient here for evaluation of dysuria.  He reports symptoms started approx 3 weeks ago. Associated symptoms include Nocturia x 1-2. He was recently treated with Cipro for UTI. He reports that he finished the course of antibiotics on Sunday 5/14/2017 but symptoms remain. He denies gross hematuria, frequency, JOSTIN, or urgency. Denies fever,  abdominal pain/pressure, or hx of kidney stones.    He is s/p TURP  4/21/2017. He reports gross hematuria that occurred approx 2 weeks after procedure. He admits to doing more strenuous activity at time of occurrence. He reports that the hematuria has since resolved. He states his urinary stream is strong and denies straining to void.      ACTIVE MEDICAL ISSUES:  Patient Active Problem List   Diagnosis    Urinary retention    Dysuria       ALLERGIES AND MEDICATIONS: updated and reviewed.  Review of patient's allergies indicates:   Allergen Reactions    Aspirin Other (See Comments)     Nose bleeds     Cholesterol analogues Other (See Comments)     "stabbing pains in muscles"    Codeine Hallucinations    Metformin Other (See Comments)     "stabbing pain in muscles"    Penicillins      Current Outpatient Prescriptions   Medication Sig    BD INSULIN SYRINGE ULTRA-FINE 1 mL 31 gauge x 5/16 Syrg     ciprofloxacin HCl (CIPRO) 500 MG tablet Take 1 tablet (500 mg total) by mouth 2 (two) times daily.    docusate sodium (COLACE) 100 MG capsule TAKE ONE CAPSULE BY MOUTH TWICE A DAY FOR 10 DAYS FOR CONSTIPATION    docusate sodium (COLACE) 100 MG capsule Take 1 capsule (100 mg total) by mouth 2 (two) times daily.    meloxicam (MOBIC) 15 MG tablet     NOVOLOG MIX 70-30 100 unit/mL (70-30) " "Soln INJECT 33 UNITS TWICE DAILY SUBCUTANEOUS    oxycodone-acetaminophen (PERCOCET) 5-325 mg per tablet Take 1 tablet by mouth every 4 (four) hours as needed for Pain.    polyethylene glycol (GLYCOLAX) 17 gram/dose powder MIX 17 GRAM(S) IN JUICE OR WATER DAILY    ranitidine (ZANTAC) 150 MG capsule Take 150 mg by mouth 2 (two) times daily.    simethicone (MYLICON) 80 MG chewable tablet Take 80 mg by mouth every 6 (six) hours as needed for Flatulence.    tamsulosin (FLOMAX) 0.4 mg Cp24 TAKE 1 CAPSULE (0.4 MG TOTAL) BY MOUTH ONCE DAILY.    finasteride (PROSCAR) 5 mg tablet Take 1 tablet (5 mg total) by mouth once daily.    phenazopyridine (PYRIDIUM) 200 MG tablet Take 1 tablet (200 mg total) by mouth 3 (three) times daily as needed for Pain.     No current facility-administered medications for this visit.        Review of Systems   Constitutional: Negative for activity change, chills, fatigue, fever and unexpected weight change.   Eyes: Negative for discharge, redness and visual disturbance.   Respiratory: Negative for cough, shortness of breath and wheezing.    Cardiovascular: Negative for chest pain and leg swelling.   Gastrointestinal: Negative for abdominal distention, abdominal pain, constipation, diarrhea, nausea and vomiting.   Genitourinary: Positive for dysuria. Negative for difficulty urinating, frequency, hematuria and urgency.   Musculoskeletal: Negative for arthralgias, joint swelling and myalgias.   Skin: Negative for color change and rash.   Neurological: Negative for dizziness and light-headedness.   Psychiatric/Behavioral: Negative for behavioral problems and confusion. The patient is not nervous/anxious.        Objective:      Vitals:    05/16/17 1406   BP: 124/72   Weight: 117.9 kg (259 lb 14.8 oz)   Height: 6' 1" (1.854 m)     Physical Exam   Constitutional: He is oriented to person, place, and time. He appears well-developed.   HENT:   Head: Normocephalic and atraumatic.   Nose: Nose normal. "   Eyes: Conjunctivae are normal. Right eye exhibits no discharge. Left eye exhibits no discharge.   Neck: Normal range of motion. Neck supple. No tracheal deviation present. No thyromegaly present.   Cardiovascular: Normal rate and regular rhythm.    Pulmonary/Chest: Effort normal. No respiratory distress. He has no wheezes.   Abdominal: Soft. He exhibits no distension. There is no hepatosplenomegaly. There is no tenderness. There is no CVA tenderness. No hernia.   Musculoskeletal: Normal range of motion. He exhibits no edema.   Neurological: He is alert and oriented to person, place, and time.   Skin: Skin is warm and dry. No rash noted. No erythema.     Psychiatric: He has a normal mood and affect. His behavior is normal. Judgment normal.         Urine dipstick shows ++ leukocytes, 500 glucose, about 250 oscar/ml blood    PVR (bladder scan) today - 0cc    Assessment:       1. Dysuria    2. Microhematuria          Plan:       1. Dysuria    - POCT Bladder Scan  - POCT urinalysis, dipstick or tablet reag  - Urine culture  - phenazopyridine (PYRIDIUM) 200 MG tablet; Take 1 tablet (200 mg total) by mouth 3 (three) times daily as needed for Pain.  Dispense: 21 tablet; Refill: 3  -Glucosuria-Discussed the importance of tight glucose control    2. Microhematuria     -Will send urine cx  -S/p TURP 4/21/17              Return if symptoms worsen or fail to improve.

## 2017-05-18 ENCOUNTER — TELEPHONE (OUTPATIENT)
Dept: UROLOGY | Facility: CLINIC | Age: 67
End: 2017-05-18

## 2017-05-19 ENCOUNTER — TELEPHONE (OUTPATIENT)
Dept: UROLOGY | Facility: CLINIC | Age: 67
End: 2017-05-19

## 2017-05-19 LAB — BACTERIA UR CULT: NORMAL

## 2017-05-19 RX ORDER — SULFAMETHOXAZOLE AND TRIMETHOPRIM 800; 160 MG/1; MG/1
1 TABLET ORAL 2 TIMES DAILY
Qty: 14 TABLET | Refills: 0 | Status: SHIPPED | OUTPATIENT
Start: 2017-05-19 | End: 2017-05-26

## 2017-05-19 NOTE — TELEPHONE ENCOUNTER
Please notify patient that recent urine cx was positive for urinary tract infection.    Bactrim sent to pharmacy.

## 2017-05-29 ENCOUNTER — TELEPHONE (OUTPATIENT)
Dept: UROLOGY | Facility: CLINIC | Age: 67
End: 2017-05-29

## 2017-05-29 NOTE — TELEPHONE ENCOUNTER
----- Message from Juanis Strickland sent at 5/29/2017  8:32 AM CDT -----  Contact: self   Pt wants to know if he needs to come in the clinic today or wait until his appointment. Pt said he finished his medication and it's not helping it's still burn when he urine. Please advise [phone] or [phone]. Thanks!

## 2017-05-29 NOTE — TELEPHONE ENCOUNTER
Spoke to pt he states was placed on bactrim which he finish for a uti. Pt states still having a lot of burning with urination an wanted to be seen by lila snowden. appt was made on 05/30/17./jhoana

## 2017-05-30 ENCOUNTER — OFFICE VISIT (OUTPATIENT)
Dept: UROLOGY | Facility: CLINIC | Age: 67
End: 2017-05-30
Payer: MEDICARE

## 2017-05-30 VITALS
HEART RATE: 70 BPM | BODY MASS INDEX: 34.51 KG/M2 | WEIGHT: 260.38 LBS | HEIGHT: 73 IN | DIASTOLIC BLOOD PRESSURE: 72 MMHG | SYSTOLIC BLOOD PRESSURE: 130 MMHG

## 2017-05-30 DIAGNOSIS — R30.0 DYSURIA: Primary | ICD-10-CM

## 2017-05-30 PROCEDURE — 1159F MED LIST DOCD IN RCRD: CPT | Mod: S$GLB,,, | Performed by: NURSE PRACTITIONER

## 2017-05-30 PROCEDURE — 99024 POSTOP FOLLOW-UP VISIT: CPT | Mod: S$GLB,,, | Performed by: NURSE PRACTITIONER

## 2017-05-30 PROCEDURE — 87086 URINE CULTURE/COLONY COUNT: CPT

## 2017-05-30 PROCEDURE — 99999 PR PBB SHADOW E&M-EST. PATIENT-LVL IV: CPT | Mod: PBBFAC,,, | Performed by: NURSE PRACTITIONER

## 2017-05-30 PROCEDURE — 1125F AMNT PAIN NOTED PAIN PRSNT: CPT | Mod: S$GLB,,, | Performed by: NURSE PRACTITIONER

## 2017-05-30 RX ORDER — FINASTERIDE 5 MG/1
5 TABLET, FILM COATED ORAL DAILY
COMMUNITY
End: 2017-09-11 | Stop reason: SINTOL

## 2017-05-30 NOTE — PROGRESS NOTES
"Subjective:       Patient ID: Camilo Kruger is a 67 y.o. male who was last seen in this office 5/16/2017    Chief Complaint:   Chief Complaint   Patient presents with    Dysuria     pt states having burning at the beginning an end of urination pt states has been on antibiotic an pyriduim but hasnt helped       Dysuria  Patient here for evaluation of dysuria.  He was treated 2x in the past 4 weeks for UTI. Last occurrence 5/16/17. He reports that dysuria never fully resolved during either treatment.  Associated symptoms include Nocturia x 1-2.  Approximately 2 weeks ago he was treated with Bactrim and pyridium for UTI. He reports that he completed the full course of abx treatment  3 days ago but he is still experiencing dysuria.  He states that the burning is felt at the start of urination and towards the end. He has not tried any other medication for ssx. He denies discharge and is in a monogamous relationship with his wife of 51 years. He reports good stream. He denies gross hematuria, frequency, JOSTIN, or urgency. Denies fever, scrotal pain or swelling,  abdominal pain/pressure, or hx of kidney stones.    He is s/p TURP 4/21/17 per Dr. Ramirez. He reports having hematuria and dysuria  post procedure but states the hematuria resolved completely.       Urine cx:  5/4/17- >100k pseudomonas  5/16/17- > 100k staph epidermidis      ACTIVE MEDICAL ISSUES:  Patient Active Problem List   Diagnosis    Urinary retention    Dysuria       ALLERGIES AND MEDICATIONS: updated and reviewed.  Review of patient's allergies indicates:   Allergen Reactions    Aspirin Other (See Comments)     Nose bleeds     Cholesterol analogues Other (See Comments)     "stabbing pains in muscles"    Codeine Hallucinations    Metformin Other (See Comments)     "stabbing pain in muscles"    Penicillins      Current Outpatient Prescriptions   Medication Sig    BD INSULIN SYRINGE ULTRA-FINE 1 mL 31 gauge x 5/16 Syrg     finasteride " "(PROSCAR) 5 mg tablet Take 5 mg by mouth once daily.    NOVOLOG MIX 70-30 100 unit/mL (70-30) Soln INJECT 33 UNITS TWICE DAILY SUBCUTANEOUS    tamsulosin (FLOMAX) 0.4 mg Cp24 TAKE 1 CAPSULE (0.4 MG TOTAL) BY MOUTH ONCE DAILY.    docusate sodium (COLACE) 100 MG capsule Take 1 capsule (100 mg total) by mouth 2 (two) times daily.     No current facility-administered medications for this visit.        Review of Systems   Constitutional: Negative for activity change, chills, fatigue, fever and unexpected weight change.   Eyes: Negative for discharge, redness and visual disturbance.   Respiratory: Negative for cough, shortness of breath and wheezing.    Cardiovascular: Negative for chest pain and leg swelling.   Gastrointestinal: Negative for abdominal distention, abdominal pain, constipation, diarrhea, nausea and vomiting.   Genitourinary: Positive for dysuria. Negative for difficulty urinating, frequency, hematuria and urgency.   Musculoskeletal: Negative for arthralgias, joint swelling and myalgias.   Skin: Negative for color change and rash.   Neurological: Negative for dizziness and light-headedness.   Psychiatric/Behavioral: Negative for behavioral problems and confusion. The patient is not nervous/anxious.        Objective:      Vitals:    05/30/17 1418   BP: 130/72   Pulse: 70   Weight: 118.1 kg (260 lb 5.8 oz)   Height: 6' 1" (1.854 m)     Physical Exam   Constitutional: He is oriented to person, place, and time. He appears well-developed.   HENT:   Head: Normocephalic and atraumatic.   Nose: Nose normal.   Eyes: Conjunctivae are normal. Right eye exhibits no discharge. Left eye exhibits no discharge.   Neck: Normal range of motion. Neck supple. No tracheal deviation present. No thyromegaly present.   Cardiovascular: Normal rate and regular rhythm.    Pulmonary/Chest: Effort normal. No respiratory distress. He has no wheezes.   Abdominal: Soft. He exhibits no distension. There is no hepatosplenomegaly. There " is no tenderness. There is no CVA tenderness. No hernia.   Musculoskeletal: Normal range of motion. He exhibits no edema.   Neurological: He is alert and oriented to person, place, and time.   Skin: Skin is warm and dry. No rash noted. No erythema.     Psychiatric: He has a normal mood and affect. His behavior is normal. Judgment normal.       Urine dipstick shows positive for WBC's.      Assessment:       1. Dysuria          Plan:       1. Dysuria    Will send Urine for culture-since recently treated with abx x2 within last 3-4 weeks, pt agrees to wait on results of urine cx prior to start of abx (if needed)  Explained to patient that irritative symptoms may persist after bacteria has been eradicated from urine  Declined Uribel at this time  Declined use of NSAIDs r/t ringing in ears and nosebleeds          Return if symptoms worsen or fail to improve.

## 2017-06-01 ENCOUNTER — TELEPHONE (OUTPATIENT)
Dept: UROLOGY | Facility: CLINIC | Age: 67
End: 2017-06-01

## 2017-06-01 LAB — BACTERIA UR CULT: NO GROWTH

## 2017-06-01 NOTE — TELEPHONE ENCOUNTER
Patient is requesting that Juaquin be called into his phamacy- he is still having s/s- please advise.

## 2017-06-01 NOTE — TELEPHONE ENCOUNTER
Uribel sent to pharmacy. Please inform patient that medication may turn urine and/or stool a blue or bluish green color but this is a harmless side effect. He may stop by to get voucher for med

## 2017-06-01 NOTE — TELEPHONE ENCOUNTER
----- Message from Jaymie La sent at 6/1/2017 11:03 AM CDT -----  Contact: self  Pt is returning Dorcas's call. Please contact her at [phone].    Thanks

## 2017-06-08 ENCOUNTER — OFFICE VISIT (OUTPATIENT)
Dept: UROLOGY | Facility: CLINIC | Age: 67
End: 2017-06-08
Payer: MEDICARE

## 2017-06-08 VITALS
HEART RATE: 68 BPM | BODY MASS INDEX: 34.47 KG/M2 | RESPIRATION RATE: 16 BRPM | SYSTOLIC BLOOD PRESSURE: 138 MMHG | WEIGHT: 260.13 LBS | DIASTOLIC BLOOD PRESSURE: 72 MMHG | HEIGHT: 73 IN

## 2017-06-08 DIAGNOSIS — R30.0 DYSURIA: ICD-10-CM

## 2017-06-08 DIAGNOSIS — R33.9 URINARY RETENTION: Primary | ICD-10-CM

## 2017-06-08 PROCEDURE — 99024 POSTOP FOLLOW-UP VISIT: CPT | Mod: S$GLB,,, | Performed by: UROLOGY

## 2017-06-08 PROCEDURE — 99999 PR PBB SHADOW E&M-EST. PATIENT-LVL III: CPT | Mod: PBBFAC,,, | Performed by: UROLOGY

## 2017-06-08 NOTE — PROGRESS NOTES
Subjective:       Camilo Kruger is a 67 y.o. male who is an established patient who was self-referred for evaluation of BPH/UR.       He reports dramatic worsening of urinary issues since Saturday. He was seen in Urgent Care and given Bactrim and Cardura 1mg for LUTS. No records available. He reports he is voiding [id number] with very small volume. He is very uncomfortable and distended. He has seen Dr Johnson in the past but was not on BPH medications. He is DM2 and sugars have been high recently since changing to long acting insulin. No A1c on record. Also with constipation, no BM x 4-5 days.    PVR (bladder scan) initial visit - >999cc. Marie catheter was placed.    He underwent void trial last week as RN visit that was successful. Unfortunately, he developed recurrent UR later that day and had marie catheter replaced (>700cc). He remains on Flomax.     Cysto/UDS 4/4/17:  Findings of the Procedure: Mild delayed sensation. No IBC/DO. EDUIN not assessed. Normal capacity bladder. Inability to void with and without P1 in place. Valsalva attempts. Cystoscopy essentially normal except for bilobar hypertrophy with kissing lobes. Uroflow with good flow but incomplete emptying with straining.   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 Bilateral prostatic hypertrophy on cystoscopy. Good flow with suspected Valsalva voiding during uroflow. Recommend TURP though concerns remain for continued UR. May require long term catheterization vs CIC. ?InterStim. Also with back issues - may be related to bladder dysfunction.     He is s/p TURP 4/21/17. BPH was not noted to be too significant. He had successful VOT post-op. He was taught CIC as backup. He has had some dysuria and hematuria. Dysuria has continued intermittently. He was given Uribel PRN last week but did not take. Urgency is most bothersome.    PVR (bladder scan) last visit - 79cc, today - 55cc      The following portions of the patient's  "history were reviewed and updated as appropriate: allergies, current medications, past family history, past medical history, past social history, past surgical history and problem list.    Review of Systems  Constitutional: no fever or chills  ENT: no nasal congestion or sore throat  Respiratory: no cough or shortness of breath  Cardiovascular: no chest pain or palpitations  Gastrointestinal: no nausea or vomiting, tolerating diet  Genitourinary: as per HPI  Hematologic/Lymphatic: no easy bruising or lymphadenopathy  Musculoskeletal: no arthralgias or myalgias  Skin: no rashes or lesions  Neurological: no seizures or tremors  Behavioral/Psych: no auditory or visual hallucinations       Objective:    Vitals: /72   Pulse 68   Resp 16   Ht 6' 1" (1.854 m)   Wt 118 kg (260 lb 2.3 oz)   BMI 34.32 kg/m²     Physical Exam   General: well developed, well nourished in no acute distress  Head: normocephalic, atraumatic  Neck: supple, trachea midline, no obvious enlargement of thyroid  HEENT: EOMI, mucus membranes moist, sclera anicteric, no hearing impairment  Lungs: symmetric expansion, non-labored breathing  Cardiovascular: regular rate and rhythm, normal pulses  Abdomen: soft, non tender, non distended, no palpable masses, no hepatosplenomegaly, no hernias, bladder nonpalpable. No CVA tenderness.  Musculoskeletal: no peripheral edema, normal ROM in bilateral upper and lower extremities  Lymphatics: no cervical or inguinal lymphadenopathy  Skin: no rashes or lesions  Neuro: alert and oriented x 3, no gross deficits  Psych: normal judgment and insight, normal mood/affect and non-anxious  Genitourinary: (last visit)  Penis -  circumcised penis without plaques, lesions, or scarring.  Urethra - orthotopic location without stenosis.  Scrotum  - no lesions or rashes, no hydrocele or hernia.  Testes - descended bilaterally, symmetric without masses, non tender.  Epididymides - no cysts or lesions, no spermatocele, " symmetric   ANNEMARIE: Symmetric 40g prostate without nodularity or tenderness. Normal landmarks. seminal vesicles non palpable, normal sphincter tone without hemorrhoids or rectal masses. Normal appearance of anus and perineum.      Lab Review   Urine analysis today in clinic shows - ++LE, 50 RBCs, 1000 glucose    Lab Results   Component Value Date    WBC 5.85 04/20/2017    HGB 14.2 04/20/2017    HCT 42.8 04/20/2017    MCV 85 04/20/2017     04/20/2017     Lab Results   Component Value Date    CREATININE 0.9 04/20/2017    BUN 17 04/20/2017     No results found for: PSA  No results found for: PSADIAG    Imaging  NA       Assessment/Plan:      1. Urinary retention    - Very high PVR initially. Distended/uncomfortable.   - Garcia placed - 1200cc initially   - Avoid/treat constipation   - Initially successful VOT but failed later that day.    - s/p cysto/UDS on 4/4/17 - atonic bladder, BPH   - May require long-term catheter, CIC, InterStim in future.   - s/p TURP on 4/21/17 - successful voiding trial   - CIC teaching today for poss UR episode   - PVR acceptable   - Will stop Flomax   - Continue Proscar   - Uribel PRN     2. Dysuria   - Azo without improvement   - Trial Uribel PRN, voucher given. Instructed to stop with increase in strain.      Follow up in   2 months for PVR

## 2017-06-15 ENCOUNTER — TELEPHONE (OUTPATIENT)
Dept: UROLOGY | Facility: CLINIC | Age: 67
End: 2017-06-15

## 2017-06-15 DIAGNOSIS — N39.0 URINARY TRACT INFECTION, SITE NOT SPECIFIED: Primary | ICD-10-CM

## 2017-06-15 NOTE — TELEPHONE ENCOUNTER
----- Message from Arlen Dwyer sent at 6/15/2017  1:30 PM CDT -----  Patient states his urine is starting to smell like rotten crab. Please call at [phone] Thank you!

## 2017-06-15 NOTE — TELEPHONE ENCOUNTER
Patient called and states that his urine has a very fowl odor and is concerned about an UTI- since no provider will be here tmrw, I have placed orders for UCx.

## 2017-06-19 ENCOUNTER — TELEPHONE (OUTPATIENT)
Dept: UROLOGY | Facility: CLINIC | Age: 67
End: 2017-06-19
Payer: MEDICARE

## 2017-06-19 ENCOUNTER — LAB VISIT (OUTPATIENT)
Dept: LAB | Facility: HOSPITAL | Age: 67
End: 2017-06-19
Attending: UROLOGY
Payer: MEDICARE

## 2017-06-19 DIAGNOSIS — N39.0 URINARY TRACT INFECTION, SITE NOT SPECIFIED: Primary | ICD-10-CM

## 2017-06-19 DIAGNOSIS — N39.0 URINARY TRACT INFECTION, SITE NOT SPECIFIED: ICD-10-CM

## 2017-06-19 LAB
BACTERIA #/AREA URNS HPF: ABNORMAL /HPF
BILIRUB SERPL-MCNC: NORMAL MG/DL
BILIRUB UR QL STRIP: NEGATIVE
BLOOD URINE, POC: 50
CLARITY UR: ABNORMAL
COLOR UR: ABNORMAL
COLOR, POC UA: YELLOW
GLUCOSE UR QL STRIP: NEGATIVE
GLUCOSE UR QL STRIP: NORMAL
HGB UR QL STRIP: NEGATIVE
HYALINE CASTS #/AREA URNS LPF: 0 /LPF
KETONES UR QL STRIP: NEGATIVE
KETONES UR QL STRIP: NORMAL
LEUKOCYTE ESTERASE UR QL STRIP: ABNORMAL
LEUKOCYTE ESTERASE URINE, POC: NORMAL
MICROSCOPIC COMMENT: ABNORMAL
NITRITE UR QL STRIP: POSITIVE
NITRITE, POC UA: NORMAL
NON-SQ EPI CELLS #/AREA URNS HPF: 1 /HPF
PH UR STRIP: 5 [PH] (ref 5–8)
PH, POC UA: 5
PROT UR QL STRIP: ABNORMAL
PROTEIN, POC: 30
RBC #/AREA URNS HPF: 2 /HPF (ref 0–4)
SP GR UR STRIP: 1.01 (ref 1–1.03)
SPECIFIC GRAVITY, POC UA: 1000
SQUAMOUS #/AREA URNS HPF: 2 /HPF
URN SPEC COLLECT METH UR: ABNORMAL
UROBILINOGEN UR STRIP-ACNC: ABNORMAL EU/DL
UROBILINOGEN, POC UA: NORMAL
WBC #/AREA URNS HPF: >100 /HPF (ref 0–5)
WBC CLUMPS URNS QL MICRO: ABNORMAL

## 2017-06-19 PROCEDURE — 81002 URINALYSIS NONAUTO W/O SCOPE: CPT | Mod: S$GLB,,, | Performed by: NURSE PRACTITIONER

## 2017-06-19 PROCEDURE — 87077 CULTURE AEROBIC IDENTIFY: CPT

## 2017-06-19 PROCEDURE — 87088 URINE BACTERIA CULTURE: CPT

## 2017-06-19 PROCEDURE — 87186 SC STD MICRODIL/AGAR DIL: CPT

## 2017-06-19 PROCEDURE — 81000 URINALYSIS NONAUTO W/SCOPE: CPT

## 2017-06-19 PROCEDURE — 87086 URINE CULTURE/COLONY COUNT: CPT

## 2017-06-19 RX ORDER — CIPROFLOXACIN 500 MG/1
500 TABLET ORAL 2 TIMES DAILY
Qty: 14 TABLET | Refills: 0 | Status: SHIPPED | OUTPATIENT
Start: 2017-06-19 | End: 2017-06-26

## 2017-06-19 NOTE — TELEPHONE ENCOUNTER
Patient with c/o foul smelling odor. UA concerning for UTI (++ leukocytes, 50 RBCs). Orders for urine cx placed. Empiric tx with cipro sent to patient's pharmacy, abx may change pending cx results

## 2017-06-21 ENCOUNTER — TELEPHONE (OUTPATIENT)
Dept: UROLOGY | Facility: CLINIC | Age: 67
End: 2017-06-21

## 2017-06-21 LAB — BACTERIA UR CULT: NORMAL

## 2017-07-05 ENCOUNTER — TELEPHONE (OUTPATIENT)
Dept: UROLOGY | Facility: CLINIC | Age: 67
End: 2017-07-05

## 2017-07-05 NOTE — TELEPHONE ENCOUNTER
----- Message from Karen Woodruff sent at 7/5/2017  9:00 AM CDT -----  Contact: self  Pt called concerning a records request. Please advise. [phone]

## 2017-07-06 ENCOUNTER — TELEPHONE (OUTPATIENT)
Dept: UROLOGY | Facility: CLINIC | Age: 67
End: 2017-07-06

## 2017-07-06 NOTE — TELEPHONE ENCOUNTER
Medical records request was brought down to medical records for patient by me. Medical records will take care of the request.

## 2017-07-06 NOTE — TELEPHONE ENCOUNTER
----- Message from Karen Woodruff sent at 7/6/2017  8:24 AM CDT -----  Contact: self  Pt is requesting a call back co concerning transferring records. Please advise. [phone]

## 2017-07-31 ENCOUNTER — TELEPHONE (OUTPATIENT)
Dept: UROLOGY | Facility: CLINIC | Age: 67
End: 2017-07-31

## 2017-07-31 NOTE — TELEPHONE ENCOUNTER
----- Message from Arlen Dwyer sent at 7/31/2017  8:12 AM CDT -----  Patient states he was in ER Sunday diagnosed with vertigo. He was given meclizine. He is stopping the finasteride because he thinks it could be a cause. Please call at [phone] thank you!

## 2017-07-31 NOTE — TELEPHONE ENCOUNTER
Pt states that he went to [facility] d/t having dizzy spells and cold sweats- he states that he was dx with vertigo with unknown cause- he states that he feels like this is a side effect from the finasteride- he has stopped the medication- please advise.

## 2017-07-31 NOTE — TELEPHONE ENCOUNTER
Finasteride is not known to cause dizziness. Okay to hold if he is concerned.     Will re-evaluate in August at his f/u appt.

## 2017-09-11 ENCOUNTER — OFFICE VISIT (OUTPATIENT)
Dept: UROLOGY | Facility: CLINIC | Age: 67
End: 2017-09-11
Payer: MEDICARE

## 2017-09-11 VITALS
HEART RATE: 60 BPM | RESPIRATION RATE: 16 BRPM | WEIGHT: 266.56 LBS | DIASTOLIC BLOOD PRESSURE: 76 MMHG | SYSTOLIC BLOOD PRESSURE: 118 MMHG | HEIGHT: 73 IN | BODY MASS INDEX: 35.33 KG/M2

## 2017-09-11 DIAGNOSIS — R30.0 DYSURIA: ICD-10-CM

## 2017-09-11 DIAGNOSIS — R33.9 URINARY RETENTION: Primary | ICD-10-CM

## 2017-09-11 PROCEDURE — 1159F MED LIST DOCD IN RCRD: CPT | Mod: S$GLB,,, | Performed by: UROLOGY

## 2017-09-11 PROCEDURE — 99214 OFFICE O/P EST MOD 30 MIN: CPT | Mod: S$GLB,,, | Performed by: UROLOGY

## 2017-09-11 PROCEDURE — 1126F AMNT PAIN NOTED NONE PRSNT: CPT | Mod: S$GLB,,, | Performed by: UROLOGY

## 2017-09-11 PROCEDURE — 99999 PR PBB SHADOW E&M-EST. PATIENT-LVL III: CPT | Mod: PBBFAC,,, | Performed by: UROLOGY

## 2017-09-11 PROCEDURE — 3008F BODY MASS INDEX DOCD: CPT | Mod: S$GLB,,, | Performed by: UROLOGY

## 2017-09-11 RX ORDER — MECLIZINE HYDROCHLORIDE 25 MG/1
TABLET ORAL
COMMUNITY
Start: 2017-07-30

## 2017-09-11 NOTE — PROGRESS NOTES
Subjective:       Camilo Kruger is a 67 y.o. male who is an established patient who was self-referred for evaluation of BPH/UR.       He reports dramatic worsening of urinary issues since Saturday. He was seen in Urgent Care and given Bactrim and Cardura 1mg for LUTS. No records available. He reports he is voiding [id number] with very small volume. He is very uncomfortable and distended. He has seen Dr Johnson in the past but was not on BPH medications. He is DM2 and sugars have been high recently since changing to long acting insulin. No A1c on record. Also with constipation, no BM x 4-5 days.    PVR (bladder scan) initial visit - >999cc. Marie catheter was placed.    He underwent void trial last week as RN visit that was successful. Unfortunately, he developed recurrent UR later that day and had marie catheter replaced (>700cc). He remains on Flomax.     Cysto/UDS 4/4/17:  Findings of the Procedure: Mild delayed sensation. No IBC/DO. EDUIN not assessed. Normal capacity bladder. Inability to void with and without P1 in place. Valsalva attempts. Cystoscopy essentially normal except for bilobar hypertrophy with kissing lobes. Uroflow with good flow but incomplete emptying with straining.   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 Bilateral prostatic hypertrophy on cystoscopy. Good flow with suspected Valsalva voiding during uroflow. Recommend TURP though concerns remain for continued UR. May require long term catheterization vs CIC. ?InterStim. Also with back issues - may be related to bladder dysfunction.     He is s/p TURP 4/21/17. BPH was not noted to be too significant. He had successful VOT post-op. He was taught CIC as backup. He has had some dysuria and hematuria. Dysuria has continued intermittently. He was given Uribel PRN last week but did not take. Urgency is most bothersome.    PVR (bladder scan) last visit - 79cc, 55cc. Today - 92cc.    He has stopped Proscar due to fainting  "issues (was evaluated at [facility]). Continues to have initial dysuria. Uribel and Azo did not improve dysuria.        The following portions of the patient's history were reviewed and updated as appropriate: allergies, current medications, past family history, past medical history, past social history, past surgical history and problem list.    Review of Systems  Constitutional: no fever or chills  ENT: no nasal congestion or sore throat  Respiratory: no cough or shortness of breath  Cardiovascular: no chest pain or palpitations  Gastrointestinal: no nausea or vomiting, tolerating diet  Genitourinary: as per HPI  Hematologic/Lymphatic: no easy bruising or lymphadenopathy  Musculoskeletal: no arthralgias or myalgias  Skin: no rashes or lesions  Neurological: no seizures or tremors  Behavioral/Psych: no auditory or visual hallucinations       Objective:    Vitals: /76   Pulse 60   Resp 16   Ht 6' 1" (1.854 m)   Wt 120.9 kg (266 lb 8.6 oz)   BMI 35.17 kg/m²     Physical Exam   General: well developed, well nourished in no acute distress  Head: normocephalic, atraumatic  Neck: supple, trachea midline, no obvious enlargement of thyroid  HEENT: EOMI, mucus membranes moist, sclera anicteric, no hearing impairment  Lungs: symmetric expansion, non-labored breathing  Cardiovascular: regular rate and rhythm, normal pulses  Abdomen: soft, non tender, non distended, no palpable masses, no hepatosplenomegaly, no hernias, bladder nonpalpable. No CVA tenderness.  Musculoskeletal: no peripheral edema, normal ROM in bilateral upper and lower extremities  Lymphatics: no cervical or inguinal lymphadenopathy  Skin: no rashes or lesions  Neuro: alert and oriented x 3, no gross deficits  Psych: normal judgment and insight, normal mood/affect and non-anxious  Genitourinary: (last visit)  Penis -  circumcised penis without plaques, lesions, or scarring.  Urethra - orthotopic location without stenosis.  Scrotum  - no lesions or " rashes, no hydrocele or hernia.  Testes - descended bilaterally, symmetric without masses, non tender.  Epididymides - no cysts or lesions, no spermatocele, symmetric   ANNEMARIE: Symmetric 40g prostate without nodularity or tenderness. Normal landmarks. seminal vesicles non palpable, normal sphincter tone without hemorrhoids or rectal masses. Normal appearance of anus and perineum.      Lab Review   Urine analysis today in clinic shows - negative    Lab Results   Component Value Date    WBC 5.85 04/20/2017    HGB 14.2 04/20/2017    HCT 42.8 04/20/2017    MCV 85 04/20/2017     04/20/2017     Lab Results   Component Value Date    CREATININE 0.9 04/20/2017    BUN 17 04/20/2017     No results found for: PSA  No results found for: PSADIAG    Imaging  NA        Assessment/Plan:      1. Urinary retention    - Very high PVR initially. Distended/uncomfortable.   - Garcia placed - 1200cc initially   - Avoid/treat constipation   - Initially successful VOT but failed later that day.    - s/p cysto/UDS on 4/4/17 - atonic bladder, BPH   - May require long-term catheter, CIC, InterStim in future.   - s/p TURP on 4/21/17 - successful voiding trial   - CIC teaching today for poss UR episode   - PVR elevated but acceptable   - Stopped Flomax, Proscar   - Uribel/Azo PRN     2. Dysuria   - Azo and Uribel without improvement   - Consider cystoscopy ([facility])       Follow up in  6 months for PVR (can follow [facility] with [facility]rolog[facility])

## 2017-12-18 ENCOUNTER — HOSPITAL ENCOUNTER (EMERGENCY)
Facility: OTHER | Age: 67
Discharge: HOME | End: 2017-12-18
Attending: EMERGENCY MEDICINE
Payer: MEDICARE

## 2017-12-18 VITALS
DIASTOLIC BLOOD PRESSURE: 81 MMHG | SYSTOLIC BLOOD PRESSURE: 171 MMHG | TEMPERATURE: 98 F | HEIGHT: 73 IN | OXYGEN SATURATION: 100 % | WEIGHT: 270 LBS | BODY MASS INDEX: 35.78 KG/M2 | HEART RATE: 81 BPM | RESPIRATION RATE: 16 BRPM

## 2017-12-18 DIAGNOSIS — S05.02XA CORNEAL ABRASION, LEFT, INITIAL ENCOUNTER: ICD-10-CM

## 2017-12-18 DIAGNOSIS — T15.92XA FOREIGN BODY IN EYE, LEFT, INITIAL ENCOUNTER: Primary | ICD-10-CM

## 2017-12-18 LAB — POCT GLUCOSE: 231 MG/DL (ref 70–110)

## 2017-12-18 PROCEDURE — 25000003 PHARM REV CODE 250: Performed by: EMERGENCY MEDICINE

## 2017-12-18 PROCEDURE — 99283 EMERGENCY DEPT VISIT LOW MDM: CPT

## 2017-12-18 RX ORDER — ERYTHROMYCIN 5 MG/G
OINTMENT OPHTHALMIC EVERY 8 HOURS
Qty: 1 TUBE | Refills: 0 | Status: SHIPPED | OUTPATIENT
Start: 2017-12-18 | End: 2017-12-23

## 2017-12-18 RX ORDER — PROPARACAINE HYDROCHLORIDE 5 MG/ML
1 SOLUTION/ DROPS OPHTHALMIC
Status: COMPLETED | OUTPATIENT
Start: 2017-12-18 | End: 2017-12-18

## 2017-12-18 RX ADMIN — PROPARACAINE HYDROCHLORIDE 1 DROP: 5 SOLUTION/ DROPS OPHTHALMIC at 02:12

## 2017-12-18 RX ADMIN — FLUORESCEIN SODIUM 1 EACH: 1 STRIP OPHTHALMIC at 02:12

## 2017-12-21 NOTE — ED PROVIDER NOTES
"Encounter Date: 12/18/2017       History     Chief Complaint   Patient presents with    Foreign Body in Eye     was outside looking up, feels like something in left eye. denies vision changes. flushed pta without relief. no foreign body noted. flushed with 10ml ns.      Mr Kruger reports he was looking up and felt something get in his L eye. Reports onset just pta. No home intervention tried. Reported pain and fb sensation upon arrival but states he flushed his eye out after arrival and sxs have now resolved. He has no complaints upon my entry into room.       The history is provided by the patient.     Review of patient's allergies indicates:   Allergen Reactions    Aspirin Other (See Comments)     Nose bleeds     Cholesterol analogues Other (See Comments)     "stabbing pains in muscles"    Codeine Hallucinations    Metformin Other (See Comments)     "stabbing pain in muscles"    Penicillins      Past Medical History:   Diagnosis Date    Diabetes mellitus     Enlarged prostate     Garcia catheter in place     Sciatica, right side     Wears hearing aid      Past Surgical History:   Procedure Laterality Date    FIRST RIB REMOVAL      due to thoracic outlet syndrome    gallbadder surgery       No family history on file.  Social History   Substance Use Topics    Smoking status: Never Smoker    Smokeless tobacco: Never Used    Alcohol use No     Review of Systems   Eyes: Negative for visual disturbance.        Fb in L eye, sensation resolved.    All other systems reviewed and are negative.      Physical Exam     Initial Vitals [12/18/17 1338]   BP Pulse Resp Temp SpO2   (!) 171/81 81 16 97.9 °F (36.6 °C) 100 %      MAP       111         Physical Exam    Nursing note and vitals reviewed.  Constitutional: He appears well-developed and well-nourished. He is not diaphoretic. No distress.   HENT:   Head: Atraumatic.   Eyes: Conjunctivae and EOM are normal. Pupils are equal, round, and reactive to light. " Right eye exhibits no discharge. Left eye exhibits no discharge. No scleral icterus.   L: Fluorescein strip used, woods lamp. No fb noted, lid swept. Positive hazy uptake at 12oclock. Pterygium noted.    Neck: Neck supple.   Pulmonary/Chest: No respiratory distress.   Musculoskeletal: Normal range of motion.   Neurological: He is alert and oriented to person, place, and time.   Skin: Skin is warm. No erythema.   Psychiatric: He has a normal mood and affect. Thought content normal.         ED Course   Procedures  Labs Reviewed   POCT GLUCOSE - Abnormal; Notable for the following:        Result Value    POCT Glucose 231 (*)     All other components within normal limits             Medical Decision Making:   ED Management:  No fb noted but will treat slight haze over top of cornea with abx ointment. Stable for d/c. We discussed home care and f/u and worrisome signs that should prompt need to return to the er should they occur. There is no indication for further emergent intervention or evaluation at this time.                      ED Course      Clinical Impression:   The primary encounter diagnosis was Foreign body in eye, left, initial encounter. A diagnosis of Corneal abrasion, left, initial encounter was also pertinent to this visit.                           Raphael Vences MD  12/20/17 1834

## 2018-01-01 NOTE — ADDENDUM NOTE
Addended by: NACHO MENDOZA on: 6/19/2017 01:41 PM     Modules accepted: Orders     Raymon Davey), Pediatrics  [address] [place] [id number]  Phone: [phone]  Fax: [phone]

## 2018-08-14 ENCOUNTER — OFFICE VISIT (OUTPATIENT)
Dept: UROLOGY | Facility: CLINIC | Age: 68
End: 2018-08-14
Payer: MEDICARE

## 2018-08-14 VITALS — WEIGHT: 264 LBS | HEIGHT: 73 IN | RESPIRATION RATE: 16 BRPM | BODY MASS INDEX: 34.99 KG/M2

## 2018-08-14 DIAGNOSIS — R35.0 URINARY FREQUENCY: ICD-10-CM

## 2018-08-14 DIAGNOSIS — R33.9 INCOMPLETE BLADDER EMPTYING: ICD-10-CM

## 2018-08-14 DIAGNOSIS — R81 GLUCOSURIA: ICD-10-CM

## 2018-08-14 DIAGNOSIS — R30.0 DYSURIA: Primary | ICD-10-CM

## 2018-08-14 LAB
BILIRUB SERPL-MCNC: NORMAL MG/DL
BLOOD URINE, POC: 50
COLOR, POC UA: YELLOW
GLUCOSE UR QL STRIP: 1000
KETONES UR QL STRIP: NORMAL
LEUKOCYTE ESTERASE URINE, POC: NORMAL
NITRITE, POC UA: NORMAL
PH, POC UA: 7
POC RESIDUAL URINE VOLUME: 115 ML (ref 0–100)
PROTEIN, POC: NORMAL
SPECIFIC GRAVITY, POC UA: 1005
UROBILINOGEN, POC UA: 1

## 2018-08-14 PROCEDURE — 99214 OFFICE O/P EST MOD 30 MIN: CPT | Mod: 25,S$GLB,, | Performed by: NURSE PRACTITIONER

## 2018-08-14 PROCEDURE — 99999 PR PBB SHADOW E&M-EST. PATIENT-LVL III: CPT | Mod: PBBFAC,,, | Performed by: NURSE PRACTITIONER

## 2018-08-14 PROCEDURE — 87086 URINE CULTURE/COLONY COUNT: CPT

## 2018-08-14 PROCEDURE — 51798 US URINE CAPACITY MEASURE: CPT | Mod: S$GLB,,, | Performed by: NURSE PRACTITIONER

## 2018-08-14 PROCEDURE — 81001 URINALYSIS AUTO W/SCOPE: CPT | Mod: S$GLB,,, | Performed by: NURSE PRACTITIONER

## 2018-08-14 RX ORDER — PHENAZOPYRIDINE HYDROCHLORIDE 200 MG/1
200 TABLET, FILM COATED ORAL 3 TIMES DAILY PRN
Qty: 20 TABLET | Refills: 0 | Status: SHIPPED | OUTPATIENT
Start: 2018-08-14 | End: 2018-08-24

## 2018-08-14 NOTE — PROGRESS NOTES
"Subjective:       Patient ID: Camilo Kruger is a 68 y.o. male who is an established patient was last seen in this office 9/11/17    Chief Complaint:   Chief Complaint   Patient presents with    Urinary Tract Infection     Patient has had issues with acute urinary retention in the past requiring marie placement. He underwent cysto/UDS in 4/2017. This showed atonic bladder and BPH.     He is s/p TURP 4/21/17. BPH was not noted to be too significant. He had successful VOT post-op. He was taught CIC as backup. He had some dysuria and hematuria post operatively.  Dysuria continued intermittently. Uribel and Azo did not improve dysuria. He stopped Proscar due to fainting issues (was evaluated at [facility]).      He returns today after ~ 1 year with c/o dysuria and urinary frequency x 2 days. He reports dysuria had previously resolved until now. He has not tried any medications for symptoms. Denies gross hematuria, flank pain, n/v or fever. He is not straining to void. Denies constipation or suprapubic pressure/pain. He has noted some improvement in symptoms since their onset    PVR (bladder scan) today - 115 ml    ACTIVE MEDICAL ISSUES:  Patient Active Problem List   Diagnosis    Urinary retention    Dysuria       ALLERGIES AND MEDICATIONS: updated and reviewed.  Review of patient's allergies indicates:   Allergen Reactions    Aspirin Other (See Comments)     Nose bleeds     Cholesterol analogues Other (See Comments)     "stabbing pains in muscles"    Codeine Hallucinations    Metformin Other (See Comments)     "stabbing pain in muscles"    Penicillins      Current Outpatient Medications   Medication Sig    BD INSULIN SYRINGE ULTRA-FINE 1 mL 31 gauge x 5/16 Syrg     docusate sodium (COLACE) 100 MG capsule Take 1 capsule (100 mg total) by mouth 2 (two) times daily.    meclizine (ANTIVERT) 25 mg tablet     methen-m.blue-s.phos-phsal-hyo (URIBEL) 118-10-40.8-36 mg Cap Take 1 capsule by mouth 3 (three) times " "daily as needed (painful urination).    NOVOLOG MIX 70-30 100 unit/mL (70-30) Soln INJECT 33 UNITS TWICE DAILY SUBCUTANEOUS    phenazopyridine (PYRIDIUM) 200 MG tablet Take 1 tablet (200 mg total) by mouth 3 (three) times daily as needed.     No current facility-administered medications for this visit.        Review of Systems   Constitutional: Negative for activity change, chills, fatigue, fever and unexpected weight change.   Eyes: Negative for discharge, redness and visual disturbance.   Respiratory: Negative for cough, shortness of breath and wheezing.    Cardiovascular: Negative for chest pain and leg swelling.   Gastrointestinal: Negative for abdominal distention, abdominal pain, constipation, diarrhea, nausea and vomiting.   Genitourinary: Positive for dysuria and frequency. Negative for decreased urine volume, difficulty urinating, discharge, flank pain, hematuria, penile pain, scrotal swelling, testicular pain and urgency.   Musculoskeletal: Negative for arthralgias, joint swelling and myalgias.   Skin: Negative for color change and rash.   Neurological: Negative for dizziness and light-headedness.   Psychiatric/Behavioral: Negative for behavioral problems and confusion. The patient is not nervous/anxious.        Objective:      Vitals:    08/14/18 1144   Resp: 16   Weight: 119.7 kg (264 lb)   Height: 6' 1" (1.854 m)     Physical Exam   Constitutional: He is oriented to person, place, and time. He appears well-developed.   HENT:   Head: Normocephalic and atraumatic.   Nose: Nose normal.   Eyes: Conjunctivae are normal. Right eye exhibits no discharge. Left eye exhibits no discharge.   Neck: Normal range of motion. Neck supple. No tracheal deviation present. No thyromegaly present.   Cardiovascular: Normal rate and regular rhythm.    Pulmonary/Chest: Effort normal. No respiratory distress. He has no wheezes.   Abdominal: Soft. He exhibits no distension. There is no hepatosplenomegaly. There is no " tenderness. There is no CVA tenderness. No hernia.   Genitourinary:   Genitourinary Comments: Patient declined exam   Musculoskeletal: Normal range of motion. He exhibits no edema.   Neurological: He is alert and oriented to person, place, and time.   Skin: Skin is warm and dry. No rash noted. No erythema.     Psychiatric: He has a normal mood and affect. His behavior is normal. Judgment normal.       Urine dipstick shows trace LE, trace protein, 1000 glucose, 50 RBCs.      Assessment:       1. Dysuria    2. Urinary frequency    3. Incomplete bladder emptying    4. Glucosuria          Plan:       1. Dysuria  - POCT urinalysis, dipstick or tablet reag  - Urine culture today  - phenazopyridine (PYRIDIUM) 200 MG tablet; Take 1 tablet (200 mg total) by mouth 3 (three) times daily as needed.  Dispense: 20 tablet; Refill: 0    2. Urinary frequency  -UA with significant amount of glucose. Urinary frequency may be r/t uncontrolled DM  - POCT Bladder Scan    3. Incomplete bladder emptying  -Mildly elevated PVR. ? UTI. Will hold on marie for now.   -Discussed symptoms of acute urinary retention. RTC/ER if symptoms develop  - POCT Bladder Scan    4. Glucosuria  -UA with significant amount of glucose. Urinary frequency may be r/t uncontrolled DM  -Encouraged tight glucose control  -Defer to PCP          Follow-up in about 6 weeks (around 9/25/2018) for Follow up PVR.

## 2018-08-16 ENCOUNTER — TELEPHONE (OUTPATIENT)
Dept: UROLOGY | Facility: CLINIC | Age: 68
End: 2018-08-16

## 2018-08-16 LAB — BACTERIA UR CULT: NO GROWTH

## 2018-08-16 NOTE — TELEPHONE ENCOUNTER
Please inform patient that recent urine culture was negative for infection. No need for antibiotics at this time.     Recommend patient keep scheduled appointment in 6 weeks to re evaluate symptoms and check PVR. He may need cystoscopy with Dr. Ramirez if dysuria still present.    Advise patient if he develops ssx of urinary retention (abdominal distention/pressure, difficulty urinating, straining to urinate, etc) he should present to ER

## 2019-12-03 ENCOUNTER — OFFICE VISIT (OUTPATIENT)
Dept: UROLOGY | Facility: CLINIC | Age: 69
End: 2019-12-03
Payer: MEDICARE

## 2019-12-03 VITALS
SYSTOLIC BLOOD PRESSURE: 141 MMHG | HEIGHT: 73 IN | DIASTOLIC BLOOD PRESSURE: 90 MMHG | BODY MASS INDEX: 34.99 KG/M2 | WEIGHT: 264 LBS

## 2019-12-03 DIAGNOSIS — R39.89 SUSPECTED UTI: ICD-10-CM

## 2019-12-03 DIAGNOSIS — R30.0 DYSURIA: Primary | ICD-10-CM

## 2019-12-03 DIAGNOSIS — R33.9 URINARY RETENTION: ICD-10-CM

## 2019-12-03 LAB
BILIRUB SERPL-MCNC: NORMAL MG/DL
BLOOD URINE, POC: 50
COLOR, POC UA: NORMAL
GLUCOSE UR QL STRIP: NORMAL
KETONES UR QL STRIP: NORMAL
LEUKOCYTE ESTERASE URINE, POC: POSITIVE
NITRITE, POC UA: NORMAL
PH, POC UA: NORMAL
PROTEIN, POC: 500
SPECIFIC GRAVITY, POC UA: 6
UROBILINOGEN, POC UA: NORMAL

## 2019-12-03 PROCEDURE — 1159F PR MEDICATION LIST DOCUMENTED IN MEDICAL RECORD: ICD-10-PCS | Mod: S$GLB,,, | Performed by: NURSE PRACTITIONER

## 2019-12-03 PROCEDURE — 99214 OFFICE O/P EST MOD 30 MIN: CPT | Mod: 25,S$GLB,, | Performed by: NURSE PRACTITIONER

## 2019-12-03 PROCEDURE — 1125F PR PAIN SEVERITY QUANTIFIED, PAIN PRESENT: ICD-10-PCS | Mod: S$GLB,,, | Performed by: NURSE PRACTITIONER

## 2019-12-03 PROCEDURE — 99214 PR OFFICE/OUTPT VISIT, EST, LEVL IV, 30-39 MIN: ICD-10-PCS | Mod: 25,S$GLB,, | Performed by: NURSE PRACTITIONER

## 2019-12-03 PROCEDURE — 87077 CULTURE AEROBIC IDENTIFY: CPT

## 2019-12-03 PROCEDURE — 1159F MED LIST DOCD IN RCRD: CPT | Mod: S$GLB,,, | Performed by: NURSE PRACTITIONER

## 2019-12-03 PROCEDURE — 81001 URINALYSIS AUTO W/SCOPE: CPT | Mod: S$GLB,,, | Performed by: NURSE PRACTITIONER

## 2019-12-03 PROCEDURE — 51798 PR MEAS,POST-VOID RES,US,NON-IMAGING: ICD-10-PCS | Mod: S$GLB,,, | Performed by: NURSE PRACTITIONER

## 2019-12-03 PROCEDURE — 87186 SC STD MICRODIL/AGAR DIL: CPT

## 2019-12-03 PROCEDURE — 81001 POCT URINALYSIS, DIPSTICK OR TABLET REAGENT, AUTOMATED, WITH MICROSCOP: ICD-10-PCS | Mod: S$GLB,,, | Performed by: NURSE PRACTITIONER

## 2019-12-03 PROCEDURE — 99999 PR PBB SHADOW E&M-EST. PATIENT-LVL III: CPT | Mod: PBBFAC,,, | Performed by: NURSE PRACTITIONER

## 2019-12-03 PROCEDURE — 51798 US URINE CAPACITY MEASURE: CPT | Mod: S$GLB,,, | Performed by: NURSE PRACTITIONER

## 2019-12-03 PROCEDURE — 1101F PR PT FALLS ASSESS DOC 0-1 FALLS W/OUT INJ PAST YR: ICD-10-PCS | Mod: CPTII,S$GLB,, | Performed by: NURSE PRACTITIONER

## 2019-12-03 PROCEDURE — 1101F PT FALLS ASSESS-DOCD LE1/YR: CPT | Mod: CPTII,S$GLB,, | Performed by: NURSE PRACTITIONER

## 2019-12-03 PROCEDURE — 87088 URINE BACTERIA CULTURE: CPT

## 2019-12-03 PROCEDURE — 51702 PR INSERTION OF TEMPORARY INDWELLING BLADDER CATHETER, SIMPLE: ICD-10-PCS | Mod: S$GLB,,, | Performed by: NURSE PRACTITIONER

## 2019-12-03 PROCEDURE — 87086 URINE CULTURE/COLONY COUNT: CPT

## 2019-12-03 PROCEDURE — 51702 INSERT TEMP BLADDER CATH: CPT | Mod: S$GLB,,, | Performed by: NURSE PRACTITIONER

## 2019-12-03 PROCEDURE — 99999 PR PBB SHADOW E&M-EST. PATIENT-LVL III: ICD-10-PCS | Mod: PBBFAC,,, | Performed by: NURSE PRACTITIONER

## 2019-12-03 PROCEDURE — 1125F AMNT PAIN NOTED PAIN PRSNT: CPT | Mod: S$GLB,,, | Performed by: NURSE PRACTITIONER

## 2019-12-03 RX ORDER — CIPROFLOXACIN 500 MG/1
500 TABLET ORAL 2 TIMES DAILY
Qty: 14 TABLET | Refills: 0 | Status: SHIPPED | OUTPATIENT
Start: 2019-12-03 | End: 2019-12-06

## 2019-12-03 RX ORDER — ALFUZOSIN HYDROCHLORIDE 10 MG/1
10 TABLET, EXTENDED RELEASE ORAL
Qty: 30 TABLET | Refills: 11 | Status: SHIPPED | OUTPATIENT
Start: 2019-12-03 | End: 2021-01-08 | Stop reason: SDUPTHER

## 2019-12-03 RX ORDER — PANTOPRAZOLE SODIUM 40 MG/1
40 TABLET, DELAYED RELEASE ORAL
COMMUNITY
Start: 2019-10-08 | End: 2020-10-07

## 2019-12-03 RX ORDER — LISINOPRIL 10 MG/1
10 TABLET ORAL
COMMUNITY
Start: 2019-10-08 | End: 2020-10-07

## 2019-12-03 NOTE — PROGRESS NOTES
"Subjective:       Patient ID: Camilo Kruger is a 69 y.o. male who was last seen in this office 8/14/18    Chief Complaint:   Chief Complaint   Patient presents with    Other     Pt. states since Thurs.. he just trickles when he goes to the bathroom .. he is having pain and had to catheterize himself just to get some relief.. he states this morning he had a good void but not like normal..        Patient has had issues with acute urinary retention in the past requiring marie placement. He underwent cysto/UDS in 4/2017. This showed atonic bladder and BPH.     He is s/p TURP 4/21/17. BPH was not noted to be too significant. He had successful VOT post-op. He was taught CIC as backup. He had some dysuria and hematuria post operatively.  Dysuria continued intermittently. Uribel and Azo did not improve dysuria. He stopped Proscar due to fainting issues (was evaluated at [facility]).      He presented with UTI symptoms (dysuria and urinary frequency)  in 8/2018. UCx negative at that time    He has been lost to follow up until now. Today he reports dysuria, urinary frequency and suprapubic pressure for the past 6 days. Also reports dribbling of urine. He has not tried any medications for his symptoms. Denies gross hematuria, flank pain or fever. UA today concerning for a UTI      PVR (bladder scan) last visit - 115 ml.  PVR (bladder scan) today - 266 ml       ACTIVE MEDICAL ISSUES:  Patient Active Problem List   Diagnosis    Urinary retention    Dysuria       ALLERGIES AND MEDICATIONS: updated and reviewed.  Review of patient's allergies indicates:   Allergen Reactions    Aspirin Other (See Comments)     Nose bleeds     Cholesterol analogues Other (See Comments)     "stabbing pains in muscles"    Codeine Hallucinations    Ezetimibe     Metformin Other (See Comments)     "stabbing pain in muscles"    Penicillins     Statins-hmg-coa reductase inhibitors      Current Outpatient Medications   Medication Sig    BD " "INSULIN SYRINGE ULTRA-FINE 1 mL 31 gauge x 5/16 Syrg     docusate sodium (COLACE) 100 MG capsule Take 1 capsule (100 mg total) by mouth 2 (two) times daily.    lisinopril 10 MG tablet Take 10 mg by mouth.    meclizine (ANTIVERT) 25 mg tablet     methen-m.blue-s.phos-phsal-hyo (URIBEL) 118-10-40.8-36 mg Cap Take 1 capsule by mouth 3 (three) times daily as needed (painful urination).    NOVOLOG MIX 70-30 100 unit/mL (70-30) Soln INJECT 33 UNITS TWICE DAILY SUBCUTANEOUS    pantoprazole (PROTONIX) 40 MG tablet Take 40 mg by mouth.    ranitidine (ZANTAC) 150 MG capsule Take 150 mg by mouth.    alfuzosin (UROXATRAL) 10 mg Tb24 Take 1 tablet (10 mg total) by mouth daily with breakfast.    ciprofloxacin HCl (CIPRO) 500 MG tablet Take 1 tablet (500 mg total) by mouth 2 (two) times daily. for 7 days     No current facility-administered medications for this visit.        Review of Systems   Constitutional: Negative for activity change, chills, fatigue, fever and unexpected weight change.   Eyes: Negative for discharge, redness and visual disturbance.   Respiratory: Negative for cough, shortness of breath and wheezing.    Cardiovascular: Negative for chest pain and leg swelling.   Gastrointestinal: Negative for abdominal distention, abdominal pain, constipation, diarrhea, nausea and vomiting.   Genitourinary: Positive for decreased urine volume, difficulty urinating, dysuria and frequency. Negative for discharge, flank pain, hematuria, penile swelling, scrotal swelling, testicular pain and urgency.   Musculoskeletal: Negative for arthralgias, joint swelling and myalgias.   Skin: Negative for color change and rash.   Neurological: Negative for dizziness and light-headedness.   Psychiatric/Behavioral: Negative for behavioral problems and confusion. The patient is not nervous/anxious.        Objective:      Vitals:    12/03/19 0931   BP: (!) 141/90   Weight: 119.7 kg (264 lb)   Height: 6' 1" (1.854 m)     Physical Exam "   Constitutional: He is oriented to person, place, and time. He appears well-developed.   HENT:   Head: Normocephalic and atraumatic.   Nose: Nose normal.   Eyes: Conjunctivae are normal. Right eye exhibits no discharge. Left eye exhibits no discharge.   Neck: Normal range of motion. Neck supple. No tracheal deviation present. No thyromegaly present.   Cardiovascular: Normal rate and regular rhythm.    Pulmonary/Chest: Effort normal. No respiratory distress. He has no wheezes.   Abdominal: Soft. He exhibits no distension. There is no hepatosplenomegaly. There is tenderness in the suprapubic area. There is no CVA tenderness. No hernia.   Genitourinary:   Genitourinary Comments: Patient declined exam   Musculoskeletal: Normal range of motion. He exhibits no edema.   Neurological: He is alert and oriented to person, place, and time.   Skin: Skin is warm and dry. No rash noted. No erythema.     Psychiatric: He has a normal mood and affect. His behavior is normal. Judgment normal.       Urine dipstick shows ++LE, +Nitrite, trace protein, 500 glucose, 50 RBCs.      16Fr inserted using sterile technique without difficulty. Patient tolerated procedure without any immediate complications. 500 ml UOP    Assessment:       1. Dysuria    2. Urinary retention    3. Suspected UTI          Plan:       1. Dysuria  -Empiric Cipro  - POCT urinalysis, dipstick or tablet reag  - Urine culture  - ciprofloxacin HCl (CIPRO) 500 MG tablet; Take 1 tablet (500 mg total) by mouth 2 (two) times daily. for 7 days  Dispense: 14 tablet; Refill: 0    2. Urinary retention  -PVR--266 ml  -Marie placed  -Will start Uroxatral (he reports dizziness with Flomax)  -RTC in 1 week for VOT  - POCT Bladder Scan  - Insert marie catheter  - alfuzosin (UROXATRAL) 10 mg Tb24; Take 1 tablet (10 mg total) by mouth daily with breakfast.  Dispense: 30 tablet; Refill: 11    3. Suspected UTI  -UA today concerning for infection  - Urine culture  - ciprofloxacin HCl  (CIPRO) 500 MG tablet; Take 1 tablet (500 mg total) by mouth 2 (two) times daily. for 7 days  Dispense: 14 tablet; Refill: 0            Follow up in about 1 week (around 12/10/2019) for voiding trial.

## 2019-12-06 ENCOUNTER — TELEPHONE (OUTPATIENT)
Dept: UROLOGY | Facility: CLINIC | Age: 69
End: 2019-12-06

## 2019-12-06 LAB — BACTERIA UR CULT: ABNORMAL

## 2019-12-06 RX ORDER — DOXYCYCLINE HYCLATE 100 MG
100 TABLET ORAL 2 TIMES DAILY
Qty: 14 TABLET | Refills: 0 | Status: SHIPPED | OUTPATIENT
Start: 2019-12-06 | End: 2019-12-13

## 2019-12-06 NOTE — TELEPHONE ENCOUNTER
Recent urine culture was positive for a UTI. Patient should stop Cipro that was given a previous visit. Appropriate antibiotics (doxycyline) were sent to pharmacy

## 2019-12-06 NOTE — TELEPHONE ENCOUNTER
Spoke to pt advised do to positive urine culture cipro is not the correct antibiotic to be on doxycycline was called in. Pt aware to stop cipro an start doxycycline pt states he fully understands.-jhoana

## 2019-12-11 ENCOUNTER — OFFICE VISIT (OUTPATIENT)
Dept: UROLOGY | Facility: CLINIC | Age: 69
End: 2019-12-11
Payer: MEDICARE

## 2019-12-11 ENCOUNTER — TELEPHONE (OUTPATIENT)
Dept: UROLOGY | Facility: CLINIC | Age: 69
End: 2019-12-11

## 2019-12-11 VITALS
SYSTOLIC BLOOD PRESSURE: 140 MMHG | WEIGHT: 263.75 LBS | DIASTOLIC BLOOD PRESSURE: 80 MMHG | BODY MASS INDEX: 34.95 KG/M2 | HEIGHT: 73 IN

## 2019-12-11 DIAGNOSIS — R33.9 URINARY RETENTION: Primary | ICD-10-CM

## 2019-12-11 DIAGNOSIS — N39.0 URINARY TRACT INFECTION WITHOUT HEMATURIA, SITE UNSPECIFIED: ICD-10-CM

## 2019-12-11 DIAGNOSIS — R30.0 DYSURIA: ICD-10-CM

## 2019-12-11 PROCEDURE — 1126F AMNT PAIN NOTED NONE PRSNT: CPT | Mod: S$GLB,,, | Performed by: NURSE PRACTITIONER

## 2019-12-11 PROCEDURE — 99999 PR PBB SHADOW E&M-EST. PATIENT-LVL III: ICD-10-PCS | Mod: PBBFAC,,, | Performed by: NURSE PRACTITIONER

## 2019-12-11 PROCEDURE — 99999 PR PBB SHADOW E&M-EST. PATIENT-LVL III: CPT | Mod: PBBFAC,,, | Performed by: NURSE PRACTITIONER

## 2019-12-11 PROCEDURE — 1101F PR PT FALLS ASSESS DOC 0-1 FALLS W/OUT INJ PAST YR: ICD-10-PCS | Mod: CPTII,S$GLB,, | Performed by: NURSE PRACTITIONER

## 2019-12-11 PROCEDURE — 1159F PR MEDICATION LIST DOCUMENTED IN MEDICAL RECORD: ICD-10-PCS | Mod: S$GLB,,, | Performed by: NURSE PRACTITIONER

## 2019-12-11 PROCEDURE — 51700 IRRIGATION OF BLADDER: CPT | Mod: S$GLB,,, | Performed by: NURSE PRACTITIONER

## 2019-12-11 PROCEDURE — 99214 OFFICE O/P EST MOD 30 MIN: CPT | Mod: 25,S$GLB,, | Performed by: NURSE PRACTITIONER

## 2019-12-11 PROCEDURE — 1159F MED LIST DOCD IN RCRD: CPT | Mod: S$GLB,,, | Performed by: NURSE PRACTITIONER

## 2019-12-11 PROCEDURE — 1126F PR PAIN SEVERITY QUANTIFIED, NO PAIN PRESENT: ICD-10-PCS | Mod: S$GLB,,, | Performed by: NURSE PRACTITIONER

## 2019-12-11 PROCEDURE — 99214 PR OFFICE/OUTPT VISIT, EST, LEVL IV, 30-39 MIN: ICD-10-PCS | Mod: 25,S$GLB,, | Performed by: NURSE PRACTITIONER

## 2019-12-11 PROCEDURE — 1101F PT FALLS ASSESS-DOCD LE1/YR: CPT | Mod: CPTII,S$GLB,, | Performed by: NURSE PRACTITIONER

## 2019-12-11 PROCEDURE — 51700 PR IRRIGATION, BLADDER: ICD-10-PCS | Mod: S$GLB,,, | Performed by: NURSE PRACTITIONER

## 2019-12-11 NOTE — TELEPHONE ENCOUNTER
----- Message from Lexi Peters sent at 12/11/2019  2:42 PM CST -----  Contact: self [phone]  .Type: Patient Call Back    Who called: self     What is the request in detail: Pt stated that he was told to call back this afternoon to see if he needed to come back in today     Can the clinic reply by [facility]? Call back     Would the patient rather a call back or a response via [facility]?  Call back     Best call back number: [phone]

## 2019-12-11 NOTE — PROGRESS NOTES
"Subjective:       Patient ID: Camilo Kruger is a 69 y.o. male who was last seen in this office 12/3/2019    Chief Complaint:   Chief Complaint   Patient presents with    Follow-up     Pt. is here for VOT to be performed.. the process was explained to him and he understands       Patient has had issues with acute urinary retention in the past requiring mraie placement. He underwent cysto/UDS in 4/2017. This showed atonic bladder and BPH.     He is s/p TURP 4/21/17. BPH was not noted to be too significant. He had successful VOT post-op. He was taught CIC as backup. He had some dysuria and hematuria post operatively.  Dysuria continued intermittently. Uribel and Azo did not improve dysuria. He stopped Proscar due to fainting issues (was evaluated at [facility]).      He presented with UTI symptoms (dysuria and urinary frequency)  in 8/2018. UCx negative at that time    He has been lost to follow up until now. He reported dysuria, urinary frequency and suprapubic pressure for a few days at his previous office visit. Also reports dribbling of urine. He was noted to be in urinary retention. Marie placed in this office. He was also started on Uroxatral at this time.     UCx--+Staphylococcus Epidermidis. UTI treated with doxycycline by myself which he is currently taking.     He is here today to have his marie removed. Denies dysuria, gross hematuria or flank pain. No new complaints      PVR (bladder scan) previous visit - 115 ml.  PVR (bladder scan) last visit - 266 ml       ACTIVE MEDICAL ISSUES:  Patient Active Problem List   Diagnosis    Urinary retention    Dysuria       ALLERGIES AND MEDICATIONS: updated and reviewed.  Review of patient's allergies indicates:   Allergen Reactions    Aspirin Other (See Comments)     Nose bleeds     Cholesterol analogues Other (See Comments)     "stabbing pains in muscles"    Codeine Hallucinations    Ezetimibe     Metformin Other (See Comments)     "stabbing pain in " "muscles"    Penicillins     Statins-hmg-coa reductase inhibitors      Current Outpatient Medications   Medication Sig    alfuzosin (UROXATRAL) 10 mg Tb24 Take 1 tablet (10 mg total) by mouth daily with breakfast.    BD INSULIN SYRINGE ULTRA-FINE 1 mL 31 gauge x 5/16 Syrg     docusate sodium (COLACE) 100 MG capsule Take 1 capsule (100 mg total) by mouth 2 (two) times daily.    doxycycline (VIBRA-TABS) 100 MG tablet Take 1 tablet (100 mg total) by mouth 2 (two) times daily. for 7 days    lisinopril 10 MG tablet Take 10 mg by mouth.    meclizine (ANTIVERT) 25 mg tablet     methen-bubbablue-s.phos-phsal-hyo (URIBEL) 118-10-40.8-36 mg Cap Take 1 capsule by mouth 3 (three) times daily as needed (painful urination).    NOVOLOG MIX 70-30 100 unit/mL (70-30) Soln INJECT 33 UNITS TWICE DAILY SUBCUTANEOUS    pantoprazole (PROTONIX) 40 MG tablet Take 40 mg by mouth.    ranitidine (ZANTAC) 150 MG capsule Take 150 mg by mouth.     No current facility-administered medications for this visit.        Review of Systems   Constitutional: Negative for activity change, chills, fatigue, fever and unexpected weight change.   Eyes: Negative for discharge, redness and visual disturbance.   Respiratory: Negative for cough, shortness of breath and wheezing.    Cardiovascular: Negative for chest pain and leg swelling.   Gastrointestinal: Negative for abdominal distention, abdominal pain, constipation, diarrhea, nausea and vomiting.   Genitourinary: Negative for decreased urine volume, difficulty urinating, discharge, dysuria, flank pain, frequency, hematuria, scrotal swelling, testicular pain and urgency.   Musculoskeletal: Negative for arthralgias, joint swelling and myalgias.   Skin: Negative for color change and rash.   Neurological: Negative for dizziness and light-headedness.   Psychiatric/Behavioral: Negative for behavioral problems and confusion. The patient is not nervous/anxious.        Objective:      Vitals:    12/11/19 " "1125   BP: (!) 140/80   Weight: 119.7 kg (263 lb 12.5 oz)   Height: 6' 1" (1.854 m)     Physical Exam   Constitutional: He is oriented to person, place, and time. He appears well-developed.   HENT:   Head: Normocephalic and atraumatic.   Nose: Nose normal.   Eyes: Conjunctivae are normal. Right eye exhibits no discharge. Left eye exhibits no discharge.   Neck: Normal range of motion. Neck supple. No tracheal deviation present. No thyromegaly present.   Cardiovascular: Normal rate and regular rhythm.    Pulmonary/Chest: Effort normal. No respiratory distress. He has no wheezes.   Abdominal: Soft. He exhibits no distension. There is no hepatosplenomegaly. There is no tenderness. There is no CVA tenderness. No hernia.   Genitourinary:   Genitourinary Comments: Marie draining yellow urine   Musculoskeletal: Normal range of motion. He exhibits no edema.   Neurological: He is alert and oriented to person, place, and time.   Skin: Skin is warm and dry. No rash noted. No erythema.     Psychiatric: He has a normal mood and affect. His behavior is normal. Judgment normal.       Urine dipstick shows not done--marie in place    Voiding Trial: 240cc instilled, 220cc voided    Assessment:       1. Urinary retention    2. Dysuria    3. Urinary tract infection without hematuria, site unspecified          Plan:       1. Urinary retention  -PVR--266 ml. Marie placed with 500 ml UOP  -Continue Uroxatral  - Voiding Trial--successful  -Suspect UR related to infection vs BPH  -Adequate hydration  -Avoid/treat constipation    2. Dysuria  -Resolved  -Likely related to UTI    3. Urinary tract infection without hematuria, site unspecified  -UCx--+Staphylococcus Epidermidis  -Continue doxycycline until medication is completed          Follow up in about 4 weeks (around 1/8/2020) for Follow up PVR.    "

## 2019-12-11 NOTE — TELEPHONE ENCOUNTER
Call to patient and asked if he is having any problems with urination. Asked if he had gone to the bathroom since he left. He stated he drank about 24 oz but has not urinated or had the urge. Explained to patient to come back to the office today. He stated understanding.

## 2019-12-13 ENCOUNTER — TELEPHONE (OUTPATIENT)
Dept: UROLOGY | Facility: CLINIC | Age: 69
End: 2019-12-13

## 2019-12-13 NOTE — TELEPHONE ENCOUNTER
Pt came to office an dropped off urine sample that he took out the leg bag. I advised pt this will not be a good sample an he didn't need to bring urine in because he just had marie replaced on 12/11/19. Pt states urine started to look a little dark but also stated he hasn't been drinking a lot. I advised he should increase his fluid intake to clear urine. Pt states he fully understands.-jhoana

## 2019-12-24 ENCOUNTER — OFFICE VISIT (OUTPATIENT)
Dept: UROLOGY | Facility: CLINIC | Age: 69
End: 2019-12-24
Payer: MEDICARE

## 2019-12-24 VITALS
SYSTOLIC BLOOD PRESSURE: 131 MMHG | BODY MASS INDEX: 34.85 KG/M2 | WEIGHT: 263 LBS | HEIGHT: 73 IN | DIASTOLIC BLOOD PRESSURE: 80 MMHG

## 2019-12-24 DIAGNOSIS — N39.0 URINARY TRACT INFECTION WITHOUT HEMATURIA, SITE UNSPECIFIED: ICD-10-CM

## 2019-12-24 DIAGNOSIS — R33.9 URINARY RETENTION: Primary | ICD-10-CM

## 2019-12-24 PROCEDURE — 1159F MED LIST DOCD IN RCRD: CPT | Mod: S$GLB,,, | Performed by: NURSE PRACTITIONER

## 2019-12-24 PROCEDURE — 99999 PR PBB SHADOW E&M-EST. PATIENT-LVL III: CPT | Mod: PBBFAC,,, | Performed by: NURSE PRACTITIONER

## 2019-12-24 PROCEDURE — 1125F AMNT PAIN NOTED PAIN PRSNT: CPT | Mod: S$GLB,,, | Performed by: NURSE PRACTITIONER

## 2019-12-24 PROCEDURE — 99213 OFFICE O/P EST LOW 20 MIN: CPT | Mod: 25,S$GLB,, | Performed by: NURSE PRACTITIONER

## 2019-12-24 PROCEDURE — 51700 PR IRRIGATION, BLADDER: ICD-10-PCS | Mod: S$GLB,,, | Performed by: NURSE PRACTITIONER

## 2019-12-24 PROCEDURE — 51700 IRRIGATION OF BLADDER: CPT | Mod: S$GLB,,, | Performed by: NURSE PRACTITIONER

## 2019-12-24 PROCEDURE — 1101F PR PT FALLS ASSESS DOC 0-1 FALLS W/OUT INJ PAST YR: ICD-10-PCS | Mod: CPTII,S$GLB,, | Performed by: NURSE PRACTITIONER

## 2019-12-24 PROCEDURE — 1125F PR PAIN SEVERITY QUANTIFIED, PAIN PRESENT: ICD-10-PCS | Mod: S$GLB,,, | Performed by: NURSE PRACTITIONER

## 2019-12-24 PROCEDURE — 99999 PR PBB SHADOW E&M-EST. PATIENT-LVL III: ICD-10-PCS | Mod: PBBFAC,,, | Performed by: NURSE PRACTITIONER

## 2019-12-24 PROCEDURE — 1101F PT FALLS ASSESS-DOCD LE1/YR: CPT | Mod: CPTII,S$GLB,, | Performed by: NURSE PRACTITIONER

## 2019-12-24 PROCEDURE — 1159F PR MEDICATION LIST DOCUMENTED IN MEDICAL RECORD: ICD-10-PCS | Mod: S$GLB,,, | Performed by: NURSE PRACTITIONER

## 2019-12-24 PROCEDURE — 99213 PR OFFICE/OUTPT VISIT, EST, LEVL III, 20-29 MIN: ICD-10-PCS | Mod: 25,S$GLB,, | Performed by: NURSE PRACTITIONER

## 2019-12-24 RX ORDER — ESOMEPRAZOLE MAGNESIUM 40 MG/1
CAPSULE, DELAYED RELEASE ORAL
COMMUNITY
Start: 2019-12-19

## 2019-12-24 NOTE — PROGRESS NOTES
Subjective:       Patient ID: Camilo Kruger is a 69 y.o. male who was last seen in this office 12/11/2019    Chief Complaint:   Chief Complaint   Patient presents with    Follow-up     Pt is here for a VOT today the process was explained to him and he understands .. and has some questions for the provider ..        Patient has had issues with acute urinary retention in the past requiring marie placement. He underwent cysto/UDS in 4/2017. This showed atonic bladder and BPH.     He is s/p TURP 4/21/17. BPH was not noted to be too significant. He had successful VOT post-op. He was taught CIC as backup. He had some dysuria and hematuria post operatively.  Dysuria continued intermittently. Uribel and Azo did not improve dysuria. He stopped Proscar due to fainting issues (was evaluated at [facility]).      He presented with UTI symptoms (dysuria and urinary frequency)  in 8/2018. UCx negative at that time    He has been lost to follow up until now. He reported dysuria, urinary frequency and suprapubic pressure for a few days at his previous office visit. Also reports dribbling of urine. He was noted to be in urinary retention. Marie placed in this office. He was also started on Uroxatral at this time.     UCx--+Staphylococcus Epidermidis. UTI treated with doxycycline by myself which he has completed    He had a successful voiding trial at previous visit. Unfortunately he returned to the office later that same day due to recurrent urinary retention. Marie replaced at that time.    He is here today for a repeat voiding trial. No new complaints      PVR (bladder scan) previous visit - 115 ml.  PVR (bladder scan) last visit - 266 ml    ACTIVE MEDICAL ISSUES:  Patient Active Problem List   Diagnosis    Urinary retention    Dysuria       ALLERGIES AND MEDICATIONS: updated and reviewed.  Review of patient's allergies indicates:   Allergen Reactions    Aspirin Other (See Comments)     Nose bleeds     Cholesterol  "analogues Other (See Comments)     "stabbing pains in muscles"    Codeine Hallucinations    Ezetimibe     Metformin Other (See Comments)     "stabbing pain in muscles"    Penicillins     Statins-hmg-coa reductase inhibitors      Current Outpatient Medications   Medication Sig    alfuzosin (UROXATRAL) 10 mg Tb24 Take 1 tablet (10 mg total) by mouth daily with breakfast.    BD INSULIN SYRINGE ULTRA-FINE 1 mL 31 gauge x 5/16 Syrg     docusate sodium (COLACE) 100 MG capsule Take 1 capsule (100 mg total) by mouth 2 (two) times daily.    esomeprazole (NEXIUM) 40 MG capsule     lisinopril 10 MG tablet Take 10 mg by mouth.    meclizine (ANTIVERT) 25 mg tablet     alexey-bubbablue-s.phos-phsal-hyo (URIBEL) 118-10-40.8-36 mg Cap Take 1 capsule by mouth 3 (three) times daily as needed (painful urination).    NOVOLOG MIX 70-30 100 unit/mL (70-30) Soln INJECT 33 UNITS TWICE DAILY SUBCUTANEOUS    pantoprazole (PROTONIX) 40 MG tablet Take 40 mg by mouth.    ranitidine (ZANTAC) 150 MG capsule Take 150 mg by mouth.     No current facility-administered medications for this visit.        Review of Systems   Constitutional: Negative for activity change, chills, fatigue, fever and unexpected weight change.   Eyes: Negative for discharge, redness and visual disturbance.   Respiratory: Negative for cough, shortness of breath and wheezing.    Cardiovascular: Negative for chest pain and leg swelling.   Gastrointestinal: Negative for abdominal distention, abdominal pain, constipation, diarrhea, nausea and vomiting.   Genitourinary: Negative for decreased urine volume, difficulty urinating, discharge, dysuria, flank pain, frequency, hematuria, penile pain, penile swelling, scrotal swelling, testicular pain and urgency.   Musculoskeletal: Negative for arthralgias, joint swelling and myalgias.   Skin: Negative for color change and rash.   Neurological: Negative for dizziness and light-headedness.   Psychiatric/Behavioral: Negative " "for behavioral problems and confusion. The patient is not nervous/anxious.        Objective:      Vitals:    12/24/19 1051   BP: 131/80   Weight: 119.3 kg (263 lb)   Height: 6' 1" (1.854 m)     Physical Exam   Constitutional: He is oriented to person, place, and time. He appears well-developed.   HENT:   Head: Normocephalic and atraumatic.   Nose: Nose normal.   Eyes: Conjunctivae are normal. Right eye exhibits no discharge. Left eye exhibits no discharge.   Neck: Normal range of motion. Neck supple. No tracheal deviation present. No thyromegaly present.   Cardiovascular: Normal rate and regular rhythm.    Pulmonary/Chest: Effort normal. No respiratory distress. He has no wheezes.   Abdominal: Soft. He exhibits no distension. There is no hepatosplenomegaly. There is no tenderness. There is no CVA tenderness. No hernia.   Genitourinary:   Genitourinary Comments: Marie draining tea colored urine--no blood clots   Musculoskeletal: Normal range of motion. He exhibits no edema.   Neurological: He is alert and oriented to person, place, and time.   Skin: Skin is warm and dry. No rash noted. No erythema.     Psychiatric: He has a normal mood and affect. His behavior is normal. Judgment normal.       Urine dipstick shows not done--marie in place    Voiding Trial: 240cc instilled, 220cc voided    Assessment:       1. Urinary retention    2. Urinary tract infection without hematuria, site unspecified          Plan:       1. Urinary retention  -PVR--266 ml. Marie placed with 500 ml UOP  -Continue Uroxatral  - Voiding Trial 12/11/19--successful. RTC later same day due to recurrent UR  - Voiding Trial today--successful  -Patient cautioned to present to the ED if symptoms of UR develop  -He may need cysto +/- UDS in the future    2. Urinary tract infection without hematuria, site unspecified  -UCx--+Staphylococcus Epidermidis  -Previously treated with doxycycline          Follow up in about 3 weeks (around 1/14/2020) for Follow " up PVR.

## 2020-01-16 ENCOUNTER — OFFICE VISIT (OUTPATIENT)
Dept: UROLOGY | Facility: CLINIC | Age: 70
End: 2020-01-16
Payer: MEDICARE

## 2020-01-16 VITALS
BODY MASS INDEX: 34.93 KG/M2 | DIASTOLIC BLOOD PRESSURE: 90 MMHG | WEIGHT: 263.56 LBS | HEIGHT: 73 IN | SYSTOLIC BLOOD PRESSURE: 140 MMHG

## 2020-01-16 DIAGNOSIS — R33.9 URINARY RETENTION: Primary | ICD-10-CM

## 2020-01-16 DIAGNOSIS — N13.8 BPH WITH OBSTRUCTION/LOWER URINARY TRACT SYMPTOMS: ICD-10-CM

## 2020-01-16 DIAGNOSIS — N40.1 BPH WITH OBSTRUCTION/LOWER URINARY TRACT SYMPTOMS: ICD-10-CM

## 2020-01-16 DIAGNOSIS — R35.1 NOCTURIA: ICD-10-CM

## 2020-01-16 PROCEDURE — 99214 OFFICE O/P EST MOD 30 MIN: CPT | Mod: 25,S$GLB,, | Performed by: NURSE PRACTITIONER

## 2020-01-16 PROCEDURE — 1159F PR MEDICATION LIST DOCUMENTED IN MEDICAL RECORD: ICD-10-PCS | Mod: S$GLB,,, | Performed by: NURSE PRACTITIONER

## 2020-01-16 PROCEDURE — 99999 PR PBB SHADOW E&M-EST. PATIENT-LVL III: CPT | Mod: PBBFAC,,, | Performed by: NURSE PRACTITIONER

## 2020-01-16 PROCEDURE — 1126F AMNT PAIN NOTED NONE PRSNT: CPT | Mod: S$GLB,,, | Performed by: NURSE PRACTITIONER

## 2020-01-16 PROCEDURE — 81001 URINALYSIS AUTO W/SCOPE: CPT | Mod: S$GLB,,, | Performed by: NURSE PRACTITIONER

## 2020-01-16 PROCEDURE — 1126F PR PAIN SEVERITY QUANTIFIED, NO PAIN PRESENT: ICD-10-PCS | Mod: S$GLB,,, | Performed by: NURSE PRACTITIONER

## 2020-01-16 PROCEDURE — 51798 PR MEAS,POST-VOID RES,US,NON-IMAGING: ICD-10-PCS | Mod: S$GLB,,, | Performed by: NURSE PRACTITIONER

## 2020-01-16 PROCEDURE — 99214 PR OFFICE/OUTPT VISIT, EST, LEVL IV, 30-39 MIN: ICD-10-PCS | Mod: 25,S$GLB,, | Performed by: NURSE PRACTITIONER

## 2020-01-16 PROCEDURE — 99999 PR PBB SHADOW E&M-EST. PATIENT-LVL III: ICD-10-PCS | Mod: PBBFAC,,, | Performed by: NURSE PRACTITIONER

## 2020-01-16 PROCEDURE — 81001 PR  URINALYSIS, AUTO, W/SCOPE: ICD-10-PCS | Mod: S$GLB,,, | Performed by: NURSE PRACTITIONER

## 2020-01-16 PROCEDURE — 1101F PT FALLS ASSESS-DOCD LE1/YR: CPT | Mod: CPTII,S$GLB,, | Performed by: NURSE PRACTITIONER

## 2020-01-16 PROCEDURE — 1101F PR PT FALLS ASSESS DOC 0-1 FALLS W/OUT INJ PAST YR: ICD-10-PCS | Mod: CPTII,S$GLB,, | Performed by: NURSE PRACTITIONER

## 2020-01-16 PROCEDURE — 1159F MED LIST DOCD IN RCRD: CPT | Mod: S$GLB,,, | Performed by: NURSE PRACTITIONER

## 2020-01-16 PROCEDURE — 51798 US URINE CAPACITY MEASURE: CPT | Mod: S$GLB,,, | Performed by: NURSE PRACTITIONER

## 2020-01-16 NOTE — PROGRESS NOTES
Subjective:       Patient ID: Camilo Kruger is a 69 y.o. male who was last seen in this office 12/24/2019    Chief Complaint:   Chief Complaint   Patient presents with    Follow-up     Pt. states that he feels one of his medications prescribed has been giving him a dry cough lately..  so he has some questions .. states no other issues        Patient has had issues with acute urinary retention in the past requiring marie placement. He underwent cysto/UDS in 4/2017. This showed atonic bladder and BPH.     He is s/p TURP 4/21/17. BPH was not noted to be too significant. He had successful VOT post-op. He was taught CIC as backup. He had some dysuria and hematuria post operatively.  Dysuria continued intermittently. Uribel and Azo did not improve dysuria. He stopped Proscar due to fainting issues (was evaluated at [facility]).      He presented with UTI symptoms (dysuria and urinary frequency)  in 8/2018. UCx negative at that time    Patient presented with c/o dysuria, urinary frequency and suprapubic pressure for a few days in 12/2019. Also reports dribbling of urine. He was noted to be in urinary retention. Marie placed in this office. He was also started on Uroxatral at this time.     UCx--+Staphylococcus Epidermidis. UTI treated with doxycycline by myself which he has completed    He had a successful voiding trial at previous visit. Unfortunately he returned to the office later that same day due to recurrent urinary retention. Marie replaced at that time.    Repeat voiding trial on 12/24/19 was successful. He is here today for a PVR check. He remains on Uroxatral. He is not straining to void. Denies dysuria, gross hematuria, or flank pain. No new complaints    PVR (bladder scan) previous visit - 115 ml.  PVR (bladder scan) last visit - 266 ml    PVR (bladder scan) today - 0 ml    ACTIVE MEDICAL ISSUES:  Patient Active Problem List   Diagnosis    Urinary retention    Dysuria       ALLERGIES AND MEDICATIONS:  "updated and reviewed.  Review of patient's allergies indicates:   Allergen Reactions    Aspirin Other (See Comments)     Nose bleeds     Cholesterol analogues Other (See Comments)     "stabbing pains in muscles"    Codeine Hallucinations    Ezetimibe     Metformin Other (See Comments)     "stabbing pain in muscles"    Penicillins     Statins-hmg-coa reductase inhibitors      Current Outpatient Medications   Medication Sig    alfuzosin (UROXATRAL) 10 mg Tb24 Take 1 tablet (10 mg total) by mouth daily with breakfast.    BD INSULIN SYRINGE ULTRA-FINE 1 mL 31 gauge x 5/16 Syrg     docusate sodium (COLACE) 100 MG capsule Take 1 capsule (100 mg total) by mouth 2 (two) times daily.    esomeprazole (NEXIUM) 40 MG capsule     lisinopril 10 MG tablet Take 10 mg by mouth.    meclizine (ANTIVERT) 25 mg tablet     methen-m.blue-s.phos-phsal-hyo (URIBEL) 118-10-40.8-36 mg Cap Take 1 capsule by mouth 3 (three) times daily as needed (painful urination).    NOVOLOG MIX 70-30 100 unit/mL (70-30) Soln INJECT 33 UNITS TWICE DAILY SUBCUTANEOUS    pantoprazole (PROTONIX) 40 MG tablet Take 40 mg by mouth.    ranitidine (ZANTAC) 150 MG capsule Take 150 mg by mouth.     No current facility-administered medications for this visit.        Review of Systems   Constitutional: Negative for activity change, chills, fatigue, fever and unexpected weight change.   Eyes: Negative for discharge, redness and visual disturbance.   Respiratory: Negative for cough, shortness of breath and wheezing.    Cardiovascular: Negative for chest pain and leg swelling.   Gastrointestinal: Negative for abdominal distention, abdominal pain, constipation, diarrhea, nausea and vomiting.   Genitourinary: Negative for decreased urine volume, difficulty urinating, discharge, dysuria, flank pain, frequency, hematuria, penile swelling, scrotal swelling, testicular pain and urgency.   Musculoskeletal: Negative for arthralgias, joint swelling and myalgias. " "  Skin: Negative for color change and rash.   Neurological: Negative for dizziness and light-headedness.   Psychiatric/Behavioral: Negative for behavioral problems and confusion. The patient is not nervous/anxious.        Objective:      Vitals:    01/16/20 1010   BP: (!) 140/90   Weight: 119.6 kg (263 lb 9 oz)   Height: 6' 1" (1.854 m)     Physical Exam   Constitutional: He is oriented to person, place, and time. He appears well-developed.   HENT:   Head: Normocephalic and atraumatic.   Nose: Nose normal.   Eyes: Conjunctivae are normal. Right eye exhibits no discharge. Left eye exhibits no discharge.   Neck: Normal range of motion. Neck supple. No tracheal deviation present. No thyromegaly present.   Cardiovascular: Normal rate and regular rhythm.    Pulmonary/Chest: Effort normal. No respiratory distress. He has no wheezes.   Abdominal: Soft. He exhibits no distension. There is no hepatosplenomegaly. There is no tenderness. There is no CVA tenderness. No hernia.   Genitourinary:   Genitourinary Comments: Patient declined exam   Musculoskeletal: Normal range of motion. He exhibits no edema.   Neurological: He is alert and oriented to person, place, and time.   Skin: Skin is warm and dry. No rash noted. No erythema.     Psychiatric: He has a normal mood and affect. His behavior is normal. Judgment normal.       Urine dipstick shows ++LE, 500 glucose, 50 RBCs.      Assessment:       1. Urinary retention    2. BPH with obstruction/lower urinary tract symptoms    3. Nocturia          Plan:       1. Urinary retention   - s/p cysto/UDS on 4/4/17 - atonic bladder, BPH   - s/p TURP on 4/21/17  - Voiding Trial 12/11/19--Initially successful but failed later that day  - Voiding Trial 12/24/19--successful  -PVR today--0 ml  -Uroxatral restarted previously--continue this medication  - POCT Bladder Scan    2. BPH with obstruction/lower urinary tract symptoms  - s/p TURP on 4/21/17  -Continue Uroxatral  - POCT urinalysis, " dipstick or tablet reag    3. Nocturia  -Limit evening fluids          Follow up in about 6 months (around 7/16/2020).

## 2020-07-16 ENCOUNTER — OFFICE VISIT (OUTPATIENT)
Dept: UROLOGY | Facility: CLINIC | Age: 70
End: 2020-07-16
Payer: MEDICARE

## 2020-07-16 VITALS
WEIGHT: 263 LBS | DIASTOLIC BLOOD PRESSURE: 90 MMHG | HEIGHT: 73 IN | BODY MASS INDEX: 34.85 KG/M2 | SYSTOLIC BLOOD PRESSURE: 144 MMHG

## 2020-07-16 DIAGNOSIS — R33.9 URINARY RETENTION: ICD-10-CM

## 2020-07-16 DIAGNOSIS — R35.1 NOCTURIA: ICD-10-CM

## 2020-07-16 DIAGNOSIS — N40.1 BPH WITH OBSTRUCTION/LOWER URINARY TRACT SYMPTOMS: Primary | ICD-10-CM

## 2020-07-16 DIAGNOSIS — N13.8 BPH WITH OBSTRUCTION/LOWER URINARY TRACT SYMPTOMS: Primary | ICD-10-CM

## 2020-07-16 PROCEDURE — 51798 US URINE CAPACITY MEASURE: CPT | Mod: S$GLB,,, | Performed by: NURSE PRACTITIONER

## 2020-07-16 PROCEDURE — 3008F BODY MASS INDEX DOCD: CPT | Mod: CPTII,S$GLB,, | Performed by: NURSE PRACTITIONER

## 2020-07-16 PROCEDURE — 99214 PR OFFICE/OUTPT VISIT, EST, LEVL IV, 30-39 MIN: ICD-10-PCS | Mod: 25,S$GLB,, | Performed by: NURSE PRACTITIONER

## 2020-07-16 PROCEDURE — 99214 OFFICE O/P EST MOD 30 MIN: CPT | Mod: 25,S$GLB,, | Performed by: NURSE PRACTITIONER

## 2020-07-16 PROCEDURE — 1101F PT FALLS ASSESS-DOCD LE1/YR: CPT | Mod: CPTII,S$GLB,, | Performed by: NURSE PRACTITIONER

## 2020-07-16 PROCEDURE — 1101F PR PT FALLS ASSESS DOC 0-1 FALLS W/OUT INJ PAST YR: ICD-10-PCS | Mod: CPTII,S$GLB,, | Performed by: NURSE PRACTITIONER

## 2020-07-16 PROCEDURE — 1159F MED LIST DOCD IN RCRD: CPT | Mod: S$GLB,,, | Performed by: NURSE PRACTITIONER

## 2020-07-16 PROCEDURE — 1126F PR PAIN SEVERITY QUANTIFIED, NO PAIN PRESENT: ICD-10-PCS | Mod: S$GLB,,, | Performed by: NURSE PRACTITIONER

## 2020-07-16 PROCEDURE — 3008F PR BODY MASS INDEX (BMI) DOCUMENTED: ICD-10-PCS | Mod: CPTII,S$GLB,, | Performed by: NURSE PRACTITIONER

## 2020-07-16 PROCEDURE — 99999 PR PBB SHADOW E&M-EST. PATIENT-LVL III: ICD-10-PCS | Mod: PBBFAC,,, | Performed by: NURSE PRACTITIONER

## 2020-07-16 PROCEDURE — 1159F PR MEDICATION LIST DOCUMENTED IN MEDICAL RECORD: ICD-10-PCS | Mod: S$GLB,,, | Performed by: NURSE PRACTITIONER

## 2020-07-16 PROCEDURE — 51798 PR MEAS,POST-VOID RES,US,NON-IMAGING: ICD-10-PCS | Mod: S$GLB,,, | Performed by: NURSE PRACTITIONER

## 2020-07-16 PROCEDURE — 81001 POCT URINALYSIS, DIPSTICK OR TABLET REAGENT, AUTOMATED, WITH MICROSCOP: ICD-10-PCS | Mod: S$GLB,,, | Performed by: NURSE PRACTITIONER

## 2020-07-16 PROCEDURE — 1126F AMNT PAIN NOTED NONE PRSNT: CPT | Mod: S$GLB,,, | Performed by: NURSE PRACTITIONER

## 2020-07-16 PROCEDURE — 99999 PR PBB SHADOW E&M-EST. PATIENT-LVL III: CPT | Mod: PBBFAC,,, | Performed by: NURSE PRACTITIONER

## 2020-07-16 PROCEDURE — 81001 URINALYSIS AUTO W/SCOPE: CPT | Mod: S$GLB,,, | Performed by: NURSE PRACTITIONER

## 2020-07-16 NOTE — PROGRESS NOTES
Subjective:       Patient ID: Camilo Kruger is a 70 y.o. male who was last seen in this office 1/16/20    Chief Complaint:   Chief Complaint   Patient presents with    Follow-up     Pt. is her for a follow up and states no new issues        Patient has had issues with acute urinary retention in the past requiring marie placement. He underwent cysto/UDS in 4/2017. This showed atonic bladder and BPH.     He is s/p TURP 4/21/17. BPH was not noted to be too significant. He had successful VOT post-op. He was taught CIC as backup. He had some dysuria and hematuria post operatively.  Dysuria continued intermittently. Uribel and Azo did not improve dysuria. He stopped Proscar due to fainting issues (was evaluated at [facility]).      He presented with UTI symptoms (dysuria and urinary frequency)  in 8/2018. UCx negative at that time    Patient presented with c/o dysuria, urinary frequency and suprapubic pressure for a few days in 12/2019. Also reports dribbling of urine. He was noted to be in urinary retention. Marie placed in this office. He was also started on Uroxatral at this time.     UCx--+Staphylococcus Epidermidis. UTI treated with doxycycline by myself which he has completed    He had a successful voiding trial on 12/11/19 Unfortunately he returned to the office later that same day due to recurrent urinary retention. Marie replaced at that time.    Repeat voiding trial on 12/24/19 was successful. Uroxatral restarted in 12/19. He tells me that he is no longer taking Uroxatral. He feels he is voiding well without any medications.  He is here today for a PVR check. No new complaints    PVR (bladder scan) previous visits - 115 ml. 266 ml    PVR (bladder scan) last visit- 0 ml  PVR (bladder scan) today - 86 ml    ACTIVE MEDICAL ISSUES:  Patient Active Problem List   Diagnosis    Urinary retention    Dysuria       ALLERGIES AND MEDICATIONS: updated and reviewed.  Review of patient's allergies indicates:   Allergen  "Reactions    Aspirin Other (See Comments)     Nose bleeds     Cholesterol analogues Other (See Comments)     "stabbing pains in muscles"    Codeine Hallucinations    Ezetimibe     Metformin Other (See Comments)     "stabbing pain in muscles"    Penicillins     Statins-hmg-coa reductase inhibitors      Current Outpatient Medications   Medication Sig    alfuzosin (UROXATRAL) 10 mg Tb24 Take 1 tablet (10 mg total) by mouth daily with breakfast.    BD INSULIN SYRINGE ULTRA-FINE 1 mL 31 gauge x 5/16 Syrg     docusate sodium (COLACE) 100 MG capsule Take 1 capsule (100 mg total) by mouth 2 (two) times daily.    esomeprazole (NEXIUM) 40 MG capsule     lisinopril 10 MG tablet Take 10 mg by mouth.    meclizine (ANTIVERT) 25 mg tablet     methen-m.blue-s.phos-phsal-hyo (URIBEL) 118-10-40.8-36 mg Cap Take 1 capsule by mouth 3 (three) times daily as needed (painful urination).    NOVOLOG MIX 70-30 100 unit/mL (70-30) Soln INJECT 33 UNITS TWICE DAILY SUBCUTANEOUS    pantoprazole (PROTONIX) 40 MG tablet Take 40 mg by mouth.    ranitidine (ZANTAC) 150 MG capsule Take 150 mg by mouth.     No current facility-administered medications for this visit.        Review of Systems   Constitutional: Negative for activity change, chills, fatigue, fever and unexpected weight change.   Eyes: Negative for discharge, redness and visual disturbance.   Respiratory: Negative for cough, shortness of breath and wheezing.    Cardiovascular: Negative for chest pain and leg swelling.   Gastrointestinal: Negative for abdominal distention, abdominal pain, constipation, diarrhea, nausea and vomiting.   Genitourinary: Negative for decreased urine volume, difficulty urinating, discharge, dysuria, flank pain, frequency, hematuria, penile swelling, scrotal swelling, testicular pain and urgency.   Musculoskeletal: Negative for arthralgias, joint swelling and myalgias.   Skin: Negative for color change and rash.   Neurological: Negative for " "dizziness and light-headedness.   Psychiatric/Behavioral: Negative for behavioral problems and confusion. The patient is not nervous/anxious.        Objective:      Vitals:    07/16/20 0948   BP: (!) 144/90   Weight: 119.3 kg (263 lb)   Height: 6' 1" (1.854 m)     Physical Exam   Constitutional: He is oriented to person, place, and time. He appears well-developed.   HENT:   Head: Normocephalic and atraumatic.   Nose: Nose normal.   Eyes: Conjunctivae are normal. Right eye exhibits no discharge. Left eye exhibits no discharge.   Neck: Normal range of motion. Neck supple. No tracheal deviation present. No thyromegaly present.   Cardiovascular: Normal rate and regular rhythm.    Pulmonary/Chest: Effort normal. No respiratory distress. He has no wheezes.   Abdominal: Soft. He exhibits no distension. There is no abdominal tenderness. No hernia.   Genitourinary:    Genitourinary Comments: Patient declined exam     Musculoskeletal: Normal range of motion.   Neurological: He is alert and oriented to person, place, and time.   Skin: Skin is warm and dry. No rash noted. No erythema.     Psychiatric: His behavior is normal. Judgment normal.       Urine dipstick shows +LE, 500 glucose.      Assessment:       1. BPH with obstruction/lower urinary tract symptoms    2. Urinary retention    3. Nocturia          Plan:       1. BPH with obstruction/lower urinary tract symptoms  - s/p cysto/UDS on 4/4/17 - atonic bladder, BPH  - s/p TURP on 4/21/17  -Currently not taking any prostate medications---doing well  -Monitor  -F/u in 1 year (patient request)   - POCT urinalysis, dipstick or tablet reag    2. Urinary retention  - Voiding Trial 12/11/19--Initially successful but failed later that day  - Voiding Trial 12/24/19--successful  -PVR --acceptable  - POCT Bladder Scan    3. Nocturia  -Limit evening fluids  - POCT urinalysis, dipstick or tablet reag            Follow up in about 1 year (around 7/16/2021) for Follow up.      "

## 2020-07-17 LAB
BILIRUB SERPL-MCNC: ABNORMAL MG/DL
BLOOD URINE, POC: ABNORMAL
COLOR, POC UA: YELLOW
GLUCOSE UR QL STRIP: 500
KETONES UR QL STRIP: ABNORMAL
LEUKOCYTE ESTERASE URINE, POC: ABNORMAL
NITRITE, POC UA: ABNORMAL
PH, POC UA: 7
PROTEIN, POC: ABNORMAL
SPECIFIC GRAVITY, POC UA: 1015
UROBILINOGEN, POC UA: ABNORMAL

## 2020-12-30 ENCOUNTER — TELEPHONE (OUTPATIENT)
Dept: UROLOGY | Facility: CLINIC | Age: 70
End: 2020-12-30

## 2020-12-30 NOTE — TELEPHONE ENCOUNTER
----- Message from Dean Betancur sent at 12/30/2020  9:45 AM CST -----  Regarding: self  Type: Patient Call Back    Who called: Self    What is the request in detail: pt has been having a fever of 100.4 and trouble urinating. Please call to discuss    Can the clinic reply by [facility]? no    Would the patient rather a call back or a response via [facility]? call    Best call back number:   [phone]

## 2020-12-30 NOTE — TELEPHONE ENCOUNTER
Attempted to reach pt to inform him of a ED f/u. no answer message left, Patient has been scheduled

## 2021-01-08 ENCOUNTER — TELEPHONE (OUTPATIENT)
Dept: UROLOGY | Facility: CLINIC | Age: 71
End: 2021-01-08

## 2021-01-08 ENCOUNTER — OFFICE VISIT (OUTPATIENT)
Dept: UROLOGY | Facility: CLINIC | Age: 71
End: 2021-01-08
Payer: MEDICARE

## 2021-01-08 VITALS
DIASTOLIC BLOOD PRESSURE: 80 MMHG | BODY MASS INDEX: 34.85 KG/M2 | HEIGHT: 73 IN | WEIGHT: 263 LBS | SYSTOLIC BLOOD PRESSURE: 144 MMHG

## 2021-01-08 DIAGNOSIS — N40.1 BPH WITH OBSTRUCTION/LOWER URINARY TRACT SYMPTOMS: ICD-10-CM

## 2021-01-08 DIAGNOSIS — R30.0 DYSURIA: ICD-10-CM

## 2021-01-08 DIAGNOSIS — N13.8 BPH WITH OBSTRUCTION/LOWER URINARY TRACT SYMPTOMS: ICD-10-CM

## 2021-01-08 DIAGNOSIS — R33.9 URINARY RETENTION: Primary | ICD-10-CM

## 2021-01-08 PROCEDURE — 51700 PR IRRIGATION, BLADDER: ICD-10-PCS | Mod: S$GLB,,, | Performed by: NURSE PRACTITIONER

## 2021-01-08 PROCEDURE — 99999 PR PBB SHADOW E&M-EST. PATIENT-LVL III: CPT | Mod: PBBFAC,,, | Performed by: NURSE PRACTITIONER

## 2021-01-08 PROCEDURE — 99214 PR OFFICE/OUTPT VISIT, EST, LEVL IV, 30-39 MIN: ICD-10-PCS | Mod: 25,S$GLB,, | Performed by: NURSE PRACTITIONER

## 2021-01-08 PROCEDURE — 1126F PR PAIN SEVERITY QUANTIFIED, NO PAIN PRESENT: ICD-10-PCS | Mod: S$GLB,,, | Performed by: NURSE PRACTITIONER

## 2021-01-08 PROCEDURE — 3288F PR FALLS RISK ASSESSMENT DOCUMENTED: ICD-10-PCS | Mod: CPTII,S$GLB,, | Performed by: NURSE PRACTITIONER

## 2021-01-08 PROCEDURE — 3008F BODY MASS INDEX DOCD: CPT | Mod: CPTII,S$GLB,, | Performed by: NURSE PRACTITIONER

## 2021-01-08 PROCEDURE — 99999 PR PBB SHADOW E&M-EST. PATIENT-LVL III: ICD-10-PCS | Mod: PBBFAC,,, | Performed by: NURSE PRACTITIONER

## 2021-01-08 PROCEDURE — 1159F PR MEDICATION LIST DOCUMENTED IN MEDICAL RECORD: ICD-10-PCS | Mod: S$GLB,,, | Performed by: NURSE PRACTITIONER

## 2021-01-08 PROCEDURE — 1101F PT FALLS ASSESS-DOCD LE1/YR: CPT | Mod: CPTII,S$GLB,, | Performed by: NURSE PRACTITIONER

## 2021-01-08 PROCEDURE — 99214 OFFICE O/P EST MOD 30 MIN: CPT | Mod: 25,S$GLB,, | Performed by: NURSE PRACTITIONER

## 2021-01-08 PROCEDURE — 51700 IRRIGATION OF BLADDER: CPT | Mod: S$GLB,,, | Performed by: NURSE PRACTITIONER

## 2021-01-08 PROCEDURE — 1101F PR PT FALLS ASSESS DOC 0-1 FALLS W/OUT INJ PAST YR: ICD-10-PCS | Mod: CPTII,S$GLB,, | Performed by: NURSE PRACTITIONER

## 2021-01-08 PROCEDURE — 1159F MED LIST DOCD IN RCRD: CPT | Mod: S$GLB,,, | Performed by: NURSE PRACTITIONER

## 2021-01-08 PROCEDURE — 3008F PR BODY MASS INDEX (BMI) DOCUMENTED: ICD-10-PCS | Mod: CPTII,S$GLB,, | Performed by: NURSE PRACTITIONER

## 2021-01-08 PROCEDURE — 1126F AMNT PAIN NOTED NONE PRSNT: CPT | Mod: S$GLB,,, | Performed by: NURSE PRACTITIONER

## 2021-01-08 PROCEDURE — 3288F FALL RISK ASSESSMENT DOCD: CPT | Mod: CPTII,S$GLB,, | Performed by: NURSE PRACTITIONER

## 2021-01-08 RX ORDER — ALFUZOSIN HYDROCHLORIDE 10 MG/1
10 TABLET, EXTENDED RELEASE ORAL
Qty: 30 TABLET | Refills: 11 | Status: SHIPPED | OUTPATIENT
Start: 2021-01-08 | End: 2022-01-08

## (undated) DEVICE — Device

## (undated) DEVICE — SEE MEDLINE ITEM 152467

## (undated) DEVICE — UNDERGLOVE BIOGEL PI SZ 6.5 LF

## (undated) DEVICE — ELECTRODE NEOTRODE II

## (undated) DEVICE — CATH ABD 7FR

## (undated) DEVICE — SOL 9P NACL IRR PIC IL

## (undated) DEVICE — SET CYSTO IRRIGATION UNIV SPIK

## (undated) DEVICE — MAT QUICK 40X30 FLOOR FLUID LF

## (undated) DEVICE — SOL IRR NACL .9% 3000ML

## (undated) DEVICE — SUPPORT ULNA NERVE PROTECTOR

## (undated) DEVICE — TUBING FOR LABORIE PUMP

## (undated) DEVICE — GLOVE BIOGEL ECLIPSE SZ 7.5

## (undated) DEVICE — CATH BLADDER/URETERAL 7FR

## (undated) DEVICE — SYR 50ML CATH TIP

## (undated) DEVICE — SOL IRR STRL WATER 500ML

## (undated) DEVICE — SEE L#95700

## (undated) DEVICE — GLOVE, SURG,LATEX FREE SZ 7

## (undated) DEVICE — GLOVE SURGICAL LATEX SZ 6.5

## (undated) DEVICE — COVER SNAP KAP 26IN

## (undated) DEVICE — CANISTER SUCTION 2 LTR

## (undated) DEVICE — SEE MEDLINE ITEM 146313

## (undated) DEVICE — SEE MEDLINE ITEM 157117

## (undated) DEVICE — SYR 10CC LUER LOCK

## (undated) DEVICE — SOL NS 1000CC

## (undated) DEVICE — SYR ONLY LUER LOCK 20CC

## (undated) DEVICE — ELECTRODE LOOP CUTTING BIPOLAR